# Patient Record
Sex: FEMALE | Race: WHITE | NOT HISPANIC OR LATINO | Employment: PART TIME | URBAN - METROPOLITAN AREA
[De-identification: names, ages, dates, MRNs, and addresses within clinical notes are randomized per-mention and may not be internally consistent; named-entity substitution may affect disease eponyms.]

---

## 2023-04-07 LAB
ALANINE AMINOTRANSFERASE (SGPT) (U/L) IN SER/PLAS: 27 U/L (ref 7–45)
ALBUMIN (G/DL) IN SER/PLAS: 3.6 G/DL (ref 3.4–5)
ALKALINE PHOSPHATASE (U/L) IN SER/PLAS: 36 U/L (ref 33–110)
ANION GAP IN SER/PLAS: 9 MMOL/L (ref 10–20)
ASPARTATE AMINOTRANSFERASE (SGOT) (U/L) IN SER/PLAS: 15 U/L (ref 9–39)
BASOPHILS (10*3/UL) IN BLOOD BY AUTOMATED COUNT: 0.06 X10E9/L (ref 0–0.1)
BASOPHILS/100 LEUKOCYTES IN BLOOD BY AUTOMATED COUNT: 0.5 % (ref 0–2)
BILIRUBIN TOTAL (MG/DL) IN SER/PLAS: 0.8 MG/DL (ref 0–1.2)
CALCIDIOL (25 OH VITAMIN D3) (NG/ML) IN SER/PLAS: 28 NG/ML
CALCIUM (MG/DL) IN SER/PLAS: 9.2 MG/DL (ref 8.6–10.3)
CARBON DIOXIDE, TOTAL (MMOL/L) IN SER/PLAS: 36 MMOL/L (ref 21–32)
CHLORIDE (MMOL/L) IN SER/PLAS: 101 MMOL/L (ref 98–107)
COBALAMIN (VITAMIN B12) (PG/ML) IN SER/PLAS: 370 PG/ML (ref 211–911)
CREATININE (MG/DL) IN SER/PLAS: 0.85 MG/DL (ref 0.5–1.05)
EOSINOPHILS (10*3/UL) IN BLOOD BY AUTOMATED COUNT: 0.31 X10E9/L (ref 0–0.7)
EOSINOPHILS/100 LEUKOCYTES IN BLOOD BY AUTOMATED COUNT: 2.8 % (ref 0–6)
ERYTHROCYTE DISTRIBUTION WIDTH (RATIO) BY AUTOMATED COUNT: 16 % (ref 11.5–14.5)
ERYTHROCYTE MEAN CORPUSCULAR HEMOGLOBIN CONCENTRATION (G/DL) BY AUTOMATED: 30 G/DL (ref 32–36)
ERYTHROCYTE MEAN CORPUSCULAR VOLUME (FL) BY AUTOMATED COUNT: 87 FL (ref 80–100)
ERYTHROCYTES (10*6/UL) IN BLOOD BY AUTOMATED COUNT: 4.74 X10E12/L (ref 4–5.2)
FERRITIN (UG/LL) IN SER/PLAS: 162 UG/L (ref 8–150)
GFR FEMALE: 79 ML/MIN/1.73M2
GLUCOSE (MG/DL) IN SER/PLAS: 103 MG/DL (ref 74–99)
HEMATOCRIT (%) IN BLOOD BY AUTOMATED COUNT: 41.3 % (ref 36–46)
HEMOGLOBIN (G/DL) IN BLOOD: 12.4 G/DL (ref 12–16)
IGE (IU/L) IN SERUM OR PLASMA: 138 IU/ML (ref 0–214)
IMMATURE GRANULOCYTES/100 LEUKOCYTES IN BLOOD BY AUTOMATED COUNT: 1.3 % (ref 0–0.9)
IRON (UG/DL) IN SER/PLAS: 84 UG/DL (ref 35–150)
IRON BINDING CAPACITY (UG/DL) IN SER/PLAS: 337 UG/DL (ref 240–445)
IRON SATURATION (%) IN SER/PLAS: 25 % (ref 25–45)
LEUKOCYTES (10*3/UL) IN BLOOD BY AUTOMATED COUNT: 11.2 X10E9/L (ref 4.4–11.3)
LYMPHOCYTES (10*3/UL) IN BLOOD BY AUTOMATED COUNT: 2.77 X10E9/L (ref 1.2–4.8)
LYMPHOCYTES/100 LEUKOCYTES IN BLOOD BY AUTOMATED COUNT: 24.7 % (ref 13–44)
MONOCYTES (10*3/UL) IN BLOOD BY AUTOMATED COUNT: 0.7 X10E9/L (ref 0.1–1)
MONOCYTES/100 LEUKOCYTES IN BLOOD BY AUTOMATED COUNT: 6.3 % (ref 2–10)
NEUTROPHILS (10*3/UL) IN BLOOD BY AUTOMATED COUNT: 7.21 X10E9/L (ref 1.2–7.7)
NEUTROPHILS/100 LEUKOCYTES IN BLOOD BY AUTOMATED COUNT: 64.4 % (ref 40–80)
PLATELETS (10*3/UL) IN BLOOD AUTOMATED COUNT: 242 X10E9/L (ref 150–450)
POTASSIUM (MMOL/L) IN SER/PLAS: 4.2 MMOL/L (ref 3.5–5.3)
PROTEIN TOTAL: 6.4 G/DL (ref 6.4–8.2)
SODIUM (MMOL/L) IN SER/PLAS: 142 MMOL/L (ref 136–145)
UREA NITROGEN (MG/DL) IN SER/PLAS: 16 MG/DL (ref 6–23)

## 2023-05-30 PROBLEM — I10 BENIGN HYPERTENSION: Status: ACTIVE | Noted: 2023-05-30

## 2023-05-30 PROBLEM — R06.02 SOB (SHORTNESS OF BREATH): Status: ACTIVE | Noted: 2023-05-30

## 2023-05-30 PROBLEM — J45.909 BRONCHIAL ASTHMA (HHS-HCC): Status: ACTIVE | Noted: 2023-05-30

## 2023-05-30 PROBLEM — G47.33 OSA (OBSTRUCTIVE SLEEP APNEA): Status: ACTIVE | Noted: 2023-05-30

## 2023-05-30 PROBLEM — E66.9 OBESITY: Status: ACTIVE | Noted: 2023-05-30

## 2023-05-30 PROBLEM — J45.901 SEVERE ASTHMA WITH EXACERBATION (HHS-HCC): Status: ACTIVE | Noted: 2023-05-30

## 2023-05-30 PROBLEM — J96.11 CHRONIC RESPIRATORY FAILURE WITH HYPOXIA (MULTI): Status: ACTIVE | Noted: 2023-05-30

## 2023-05-30 PROBLEM — K21.9 LARYNGOPHARYNGEAL REFLUX (LPR): Status: ACTIVE | Noted: 2023-05-30

## 2023-05-30 PROBLEM — R60.9 EDEMA: Status: ACTIVE | Noted: 2023-05-30

## 2023-05-30 RX ORDER — FAMOTIDINE 40 MG/1
40 TABLET, FILM COATED ORAL NIGHTLY
COMMUNITY
End: 2023-05-31 | Stop reason: ALTCHOICE

## 2023-05-30 RX ORDER — ASPIRIN 81 MG/1
1 TABLET ORAL DAILY
COMMUNITY

## 2023-05-30 RX ORDER — PREDNISONE 5 MG/1
5 TABLET ORAL DAILY
COMMUNITY
Start: 2023-03-22 | End: 2023-11-30 | Stop reason: HOSPADM

## 2023-05-30 RX ORDER — ALBUTEROL SULFATE 90 UG/1
2 AEROSOL, METERED RESPIRATORY (INHALATION) EVERY 4 HOURS PRN
COMMUNITY
Start: 2023-03-22 | End: 2023-10-23 | Stop reason: SDUPTHER

## 2023-05-30 RX ORDER — AZELASTINE 1 MG/ML
1 SPRAY, METERED NASAL 2 TIMES DAILY
COMMUNITY
End: 2023-11-30 | Stop reason: HOSPADM

## 2023-05-30 RX ORDER — LOSARTAN POTASSIUM 100 MG/1
100 TABLET ORAL DAILY
COMMUNITY
End: 2023-06-30 | Stop reason: SDUPTHER

## 2023-05-30 RX ORDER — CETIRIZINE HYDROCHLORIDE 10 MG/1
10 TABLET ORAL DAILY
COMMUNITY
End: 2023-12-19 | Stop reason: SDUPTHER

## 2023-05-30 RX ORDER — IPRATROPIUM BROMIDE AND ALBUTEROL SULFATE 2.5; .5 MG/3ML; MG/3ML
3 SOLUTION RESPIRATORY (INHALATION) EVERY 4 HOURS PRN
COMMUNITY
Start: 2023-03-27 | End: 2023-10-30 | Stop reason: SDUPTHER

## 2023-05-30 RX ORDER — THEOPHYLLINE 400 MG/1
400 TABLET, EXTENDED RELEASE ORAL DAILY
COMMUNITY
End: 2023-10-27 | Stop reason: SDUPTHER

## 2023-05-30 RX ORDER — MONTELUKAST SODIUM 10 MG/1
10 TABLET ORAL NIGHTLY
COMMUNITY
Start: 2023-03-27 | End: 2023-10-23 | Stop reason: SDUPTHER

## 2023-05-30 RX ORDER — CARVEDILOL 25 MG/1
25 TABLET ORAL
COMMUNITY
End: 2023-08-14 | Stop reason: SDUPTHER

## 2023-05-30 RX ORDER — FLUTICASONE FUROATE, UMECLIDINIUM BROMIDE AND VILANTEROL TRIFENATATE 200; 62.5; 25 UG/1; UG/1; UG/1
1 POWDER RESPIRATORY (INHALATION) DAILY
COMMUNITY
Start: 2023-03-09 | End: 2023-10-23 | Stop reason: SDUPTHER

## 2023-05-30 RX ORDER — ACETAMINOPHEN 500 MG
1 TABLET ORAL DAILY
COMMUNITY

## 2023-05-30 RX ORDER — LEVOTHYROXINE SODIUM 100 UG/1
100 TABLET ORAL
COMMUNITY
Start: 2023-04-09 | End: 2023-06-30 | Stop reason: SDUPTHER

## 2023-05-30 RX ORDER — FLUTICASONE PROPIONATE 50 MCG
1 SPRAY, SUSPENSION (ML) NASAL 2 TIMES DAILY
COMMUNITY
End: 2023-11-30 | Stop reason: HOSPADM

## 2023-05-30 RX ORDER — AMLODIPINE BESYLATE 10 MG/1
10 TABLET ORAL DAILY
COMMUNITY
End: 2023-06-30 | Stop reason: ALTCHOICE

## 2023-05-30 RX ORDER — GUAIFENESIN 1200 MG/1
1200 TABLET, EXTENDED RELEASE ORAL EVERY 12 HOURS PRN
COMMUNITY
Start: 2023-03-08

## 2023-05-31 ENCOUNTER — OFFICE VISIT (OUTPATIENT)
Dept: PRIMARY CARE | Facility: CLINIC | Age: 58
End: 2023-05-31
Payer: COMMERCIAL

## 2023-05-31 VITALS
DIASTOLIC BLOOD PRESSURE: 80 MMHG | BODY MASS INDEX: 46.32 KG/M2 | WEIGHT: 288.2 LBS | HEIGHT: 66 IN | RESPIRATION RATE: 18 BRPM | SYSTOLIC BLOOD PRESSURE: 140 MMHG | HEART RATE: 74 BPM | OXYGEN SATURATION: 94 %

## 2023-05-31 DIAGNOSIS — Z12.31 ENCOUNTER FOR SCREENING MAMMOGRAM FOR MALIGNANT NEOPLASM OF BREAST: ICD-10-CM

## 2023-05-31 DIAGNOSIS — Z13.29 SCREENING FOR HYPOTHYROIDISM: ICD-10-CM

## 2023-05-31 DIAGNOSIS — G47.33 OSA (OBSTRUCTIVE SLEEP APNEA): ICD-10-CM

## 2023-05-31 DIAGNOSIS — Z12.11 SCREENING FOR COLORECTAL CANCER: ICD-10-CM

## 2023-05-31 DIAGNOSIS — I10 BENIGN HYPERTENSION: ICD-10-CM

## 2023-05-31 DIAGNOSIS — K21.9 LARYNGOPHARYNGEAL REFLUX (LPR): ICD-10-CM

## 2023-05-31 DIAGNOSIS — R73.01 ELEVATED FASTING BLOOD SUGAR: ICD-10-CM

## 2023-05-31 DIAGNOSIS — J45.51 SEVERE PERSISTENT ASTHMA WITH EXACERBATION (MULTI): ICD-10-CM

## 2023-05-31 DIAGNOSIS — J96.11 CHRONIC RESPIRATORY FAILURE WITH HYPOXIA (MULTI): Primary | ICD-10-CM

## 2023-05-31 DIAGNOSIS — Z12.12 SCREENING FOR COLORECTAL CANCER: ICD-10-CM

## 2023-05-31 DIAGNOSIS — Z13.220 SCREENING FOR HYPERLIPIDEMIA: ICD-10-CM

## 2023-05-31 DIAGNOSIS — E66.01 CLASS 3 SEVERE OBESITY DUE TO EXCESS CALORIES WITH SERIOUS COMORBIDITY AND BODY MASS INDEX (BMI) OF 45.0 TO 49.9 IN ADULT (MULTI): ICD-10-CM

## 2023-05-31 PROBLEM — R06.02 SOB (SHORTNESS OF BREATH): Status: RESOLVED | Noted: 2023-05-30 | Resolved: 2023-05-31

## 2023-05-31 PROCEDURE — 3008F BODY MASS INDEX DOCD: CPT

## 2023-05-31 PROCEDURE — 3077F SYST BP >= 140 MM HG: CPT

## 2023-05-31 PROCEDURE — 1036F TOBACCO NON-USER: CPT

## 2023-05-31 PROCEDURE — 3079F DIAST BP 80-89 MM HG: CPT

## 2023-05-31 PROCEDURE — 99204 OFFICE O/P NEW MOD 45 MIN: CPT

## 2023-05-31 PROCEDURE — 99401 PREV MED CNSL INDIV APPRX 15: CPT

## 2023-05-31 RX ORDER — MEPOLIZUMAB 100 MG/ML
100 INJECTION, SOLUTION SUBCUTANEOUS
COMMUNITY
End: 2024-04-19

## 2023-05-31 ASSESSMENT — ENCOUNTER SYMPTOMS
CARDIOVASCULAR NEGATIVE: 1
ENDOCRINE NEGATIVE: 1
HEMATOLOGIC/LYMPHATIC NEGATIVE: 1
RESPIRATORY NEGATIVE: 1
CONSTITUTIONAL NEGATIVE: 1
EYES NEGATIVE: 1
ALLERGIC/IMMUNOLOGIC NEGATIVE: 1
GASTROINTESTINAL NEGATIVE: 1
NEUROLOGICAL NEGATIVE: 1
MUSCULOSKELETAL NEGATIVE: 1
PSYCHIATRIC NEGATIVE: 1

## 2023-05-31 ASSESSMENT — PATIENT HEALTH QUESTIONNAIRE - PHQ9
SUM OF ALL RESPONSES TO PHQ9 QUESTIONS 1 AND 2: 0
2. FEELING DOWN, DEPRESSED OR HOPELESS: NOT AT ALL
1. LITTLE INTEREST OR PLEASURE IN DOING THINGS: NOT AT ALL

## 2023-05-31 NOTE — PROGRESS NOTES
"Subjective   Patient ID: Zohra Toledo is a 58 y.o. female who presents for New Patient Visit.    HPI a 58-year-old female with past medical history of obstructive sleep apnea, severe asthma with exacerbation, chronic respiratory failure with hypoxia,  Hypertension, LPR, dependent edema, obesity arrives to the clinic with chief complaint of new patient establishment.  Patient is here to establish with a new primary care provider as her previous one is no longer in network and that she was referred here by her pulmonologist.  She continues to follow closely with our  pulmonology team for her severe asthma.  She is on 2 to 4 L via nasal cannula for respiratory hypoxia at all times.  She is also concerned about the dependent edema that has worsened over the last couple of months and is wondering if this is related to her breathing or if to her amlodipine.  Other than this, the patient denies any chest pain, shortness of breath, diarrhea, fevers, chills, head pain, COVID-like symptoms.    Review of Systems   Constitutional: Negative.    HENT: Negative.     Eyes: Negative.    Respiratory: Negative.     Cardiovascular: Negative.    Gastrointestinal: Negative.    Endocrine: Negative.    Genitourinary: Negative.    Musculoskeletal: Negative.    Skin: Negative.    Allergic/Immunologic: Negative.    Neurological: Negative.    Hematological: Negative.    Psychiatric/Behavioral: Negative.     All other systems reviewed and are negative.      Objective   /80 (BP Location: Right arm, BP Cuff Size: Large adult)   Pulse 74   Resp 18   Ht 1.676 m (5' 6\")   Wt 131 kg (288 lb 3.2 oz)   SpO2 94%   BMI 46.52 kg/m²     Physical Exam  Vitals and nursing note reviewed.   Constitutional:       Appearance: Normal appearance.   HENT:      Head: Normocephalic and atraumatic.      Right Ear: Tympanic membrane normal.      Left Ear: Tympanic membrane normal.      Nose: Nose normal.      Mouth/Throat:      Mouth: Mucous membranes " are moist.      Pharynx: Oropharynx is clear.   Eyes:      Extraocular Movements: Extraocular movements intact.      Conjunctiva/sclera: Conjunctivae normal.      Pupils: Pupils are equal, round, and reactive to light.   Cardiovascular:      Rate and Rhythm: Normal rate and regular rhythm.   Pulmonary:      Effort: Pulmonary effort is normal.      Breath sounds: Normal breath sounds.   Abdominal:      General: Bowel sounds are normal.      Palpations: Abdomen is soft.   Musculoskeletal:         General: Normal range of motion.      Cervical back: Normal range of motion and neck supple.   Skin:     General: Skin is warm.      Capillary Refill: Capillary refill takes less than 2 seconds.   Neurological:      General: No focal deficit present.      Mental Status: She is alert and oriented to person, place, and time. Mental status is at baseline.   Psychiatric:         Mood and Affect: Mood normal.         Behavior: Behavior normal.         Thought Content: Thought content normal.         Judgment: Judgment normal.         Assessment/Plan   Problem List Items Addressed This Visit          Nervous    BOBO (obstructive sleep apnea)       Respiratory    Severe asthma with exacerbation    Chronic respiratory failure with hypoxia (CMS/HCC) - Primary    Relevant Orders    CBC    Comprehensive Metabolic Panel    Follow Up In [facility] - PCP       Circulatory    Benign hypertension       Endocrine/Metabolic    Obesity       Other    Laryngopharyngeal reflux (LPR)     Other Visit Diagnoses       Elevated fasting blood sugar        Relevant Orders    Hemoglobin A1C    Follow Up In [facility] - PCP    Screening for hypothyroidism        Relevant Orders    TSH with reflex to Free T4 if abnormal    Follow Up In [facility] - PCP    Screening for hyperlipidemia        Relevant Orders    Lipid Panel    Follow Up In [facility] - PCP          It was a pleasure seeing in the office today.    I  have ordered blood work for you to complete 1 week prior to your next appointment.  These labs require to fast.  I will call you with any abnormal results.  Your next appointment will be in 1 month.    In regards to your dependent edema, you report that in the past, you are able to elevate your legs above your heart and noticed a decrease in swelling.  You report that this has not been the case over the last couple of months.  You are wondering if this might be due to the amlodipine 10 mg oral tablet daily.  We have agreed to discontinue your amlodipine 10 mg oral tablet daily and increase your losartan 100 mg daily to 100 mg oral tablet twice a day.  Do this for 1 month and we will reevaluate at your 1 month appointment.    Continue the treatment plan and oxygen therapy managed by your pulmonologist.  Continue using your CPAP machine for your BOBO prescribed by your pulmonologist as well.    Your blood pressure is controlled today.  Please begin the new blood pressure management program that we have discussed in the office today.    We have discussed screening exams in the office today.  We have discussed about a Cologuard screening kit, mammogram, bone density scan, CT cardiac scoring.  She will think about the bone density scan and CT cardiac scoring and you report that she will think about it. However you have agreed to the Cologuard screening kit and mammogram.  I have put both of these orders in.    I recommend a low carb diet and moderate to high intensity exercise for at least a total of 150 minutes per week for obesity with BMI of 46.52. Normal adult BMI that you should be targeting  is 20 to 25. Fifteen minutes was spent counselling.     Anticipatory guidance, age appropriate vaccines, screening exams, health promotion and prevention discussed.    This document was generated using the assistance of voice recognition software. If there are any errors of spelling, grammar, syntax, or meaning; please feel free  to contact me directly for clarification.

## 2023-06-19 ENCOUNTER — LAB (OUTPATIENT)
Dept: LAB | Facility: LAB | Age: 58
End: 2023-06-19
Payer: COMMERCIAL

## 2023-06-19 DIAGNOSIS — J96.11 CHRONIC RESPIRATORY FAILURE WITH HYPOXIA (MULTI): ICD-10-CM

## 2023-06-19 DIAGNOSIS — Z13.29 SCREENING FOR HYPOTHYROIDISM: ICD-10-CM

## 2023-06-19 DIAGNOSIS — R73.01 ELEVATED FASTING BLOOD SUGAR: ICD-10-CM

## 2023-06-19 DIAGNOSIS — Z13.220 SCREENING FOR HYPERLIPIDEMIA: ICD-10-CM

## 2023-06-19 LAB
ALANINE AMINOTRANSFERASE (SGPT) (U/L) IN SER/PLAS: 29 U/L (ref 7–45)
ALBUMIN (G/DL) IN SER/PLAS: 3.9 G/DL (ref 3.4–5)
ALKALINE PHOSPHATASE (U/L) IN SER/PLAS: 39 U/L (ref 33–110)
ANION GAP IN SER/PLAS: 14 MMOL/L (ref 10–20)
ASPARTATE AMINOTRANSFERASE (SGOT) (U/L) IN SER/PLAS: 17 U/L (ref 9–39)
BILIRUBIN TOTAL (MG/DL) IN SER/PLAS: 0.7 MG/DL (ref 0–1.2)
CALCIUM (MG/DL) IN SER/PLAS: 9.3 MG/DL (ref 8.6–10.3)
CARBON DIOXIDE, TOTAL (MMOL/L) IN SER/PLAS: 31 MMOL/L (ref 21–32)
CHLORIDE (MMOL/L) IN SER/PLAS: 104 MMOL/L (ref 98–107)
CHOLESTEROL (MG/DL) IN SER/PLAS: 161 MG/DL (ref 0–199)
CHOLESTEROL IN HDL (MG/DL) IN SER/PLAS: 49.2 MG/DL
CHOLESTEROL/HDL RATIO: 3.3
CREATININE (MG/DL) IN SER/PLAS: 1.01 MG/DL (ref 0.5–1.05)
ERYTHROCYTE DISTRIBUTION WIDTH (RATIO) BY AUTOMATED COUNT: 15.9 % (ref 11.5–14.5)
ERYTHROCYTE MEAN CORPUSCULAR HEMOGLOBIN CONCENTRATION (G/DL) BY AUTOMATED: 30.3 G/DL (ref 32–36)
ERYTHROCYTE MEAN CORPUSCULAR VOLUME (FL) BY AUTOMATED COUNT: 89 FL (ref 80–100)
ERYTHROCYTES (10*6/UL) IN BLOOD BY AUTOMATED COUNT: 4.5 X10E12/L (ref 4–5.2)
ESTIMATED AVERAGE GLUCOSE FOR HBA1C: 151 MG/DL
GFR FEMALE: 64 ML/MIN/1.73M2
GLUCOSE (MG/DL) IN SER/PLAS: 96 MG/DL (ref 74–99)
HEMATOCRIT (%) IN BLOOD BY AUTOMATED COUNT: 40.2 % (ref 36–46)
HEMOGLOBIN (G/DL) IN BLOOD: 12.2 G/DL (ref 12–16)
HEMOGLOBIN A1C/HEMOGLOBIN TOTAL IN BLOOD: 6.9 %
LDL: 65 MG/DL (ref 0–99)
LEUKOCYTES (10*3/UL) IN BLOOD BY AUTOMATED COUNT: 7.3 X10E9/L (ref 4.4–11.3)
NON HDL CHOLESTEROL: 112 MG/DL
PLATELETS (10*3/UL) IN BLOOD AUTOMATED COUNT: 269 X10E9/L (ref 150–450)
POTASSIUM (MMOL/L) IN SER/PLAS: 4.6 MMOL/L (ref 3.5–5.3)
PROTEIN TOTAL: 6.3 G/DL (ref 6.4–8.2)
SODIUM (MMOL/L) IN SER/PLAS: 144 MMOL/L (ref 136–145)
THYROTROPIN (MIU/L) IN SER/PLAS BY DETECTION LIMIT <= 0.05 MIU/L: 1.12 MIU/L (ref 0.44–3.98)
TRIGLYCERIDE (MG/DL) IN SER/PLAS: 236 MG/DL (ref 0–149)
UREA NITROGEN (MG/DL) IN SER/PLAS: 16 MG/DL (ref 6–23)
VLDL: 47 MG/DL (ref 0–40)

## 2023-06-19 PROCEDURE — 85027 COMPLETE CBC AUTOMATED: CPT

## 2023-06-19 PROCEDURE — 84443 ASSAY THYROID STIM HORMONE: CPT

## 2023-06-19 PROCEDURE — 83036 HEMOGLOBIN GLYCOSYLATED A1C: CPT

## 2023-06-19 PROCEDURE — 80053 COMPREHEN METABOLIC PANEL: CPT

## 2023-06-19 PROCEDURE — 80061 LIPID PANEL: CPT

## 2023-06-19 PROCEDURE — 36415 COLL VENOUS BLD VENIPUNCTURE: CPT

## 2023-06-24 LAB — NONINV COLON CA DNA+OCC BLD SCRN STL QL: NEGATIVE

## 2023-06-30 ENCOUNTER — OFFICE VISIT (OUTPATIENT)
Dept: PRIMARY CARE | Facility: CLINIC | Age: 58
End: 2023-06-30
Payer: COMMERCIAL

## 2023-06-30 VITALS
DIASTOLIC BLOOD PRESSURE: 82 MMHG | WEIGHT: 284 LBS | RESPIRATION RATE: 16 BRPM | SYSTOLIC BLOOD PRESSURE: 118 MMHG | BODY MASS INDEX: 45.64 KG/M2 | HEIGHT: 66 IN | HEART RATE: 84 BPM | OXYGEN SATURATION: 92 %

## 2023-06-30 DIAGNOSIS — R73.01 ELEVATED FASTING BLOOD SUGAR: ICD-10-CM

## 2023-06-30 DIAGNOSIS — E11.9 TYPE 2 DIABETES MELLITUS WITHOUT COMPLICATION, WITHOUT LONG-TERM CURRENT USE OF INSULIN (MULTI): Primary | ICD-10-CM

## 2023-06-30 DIAGNOSIS — R22.43 LOCALIZED SWELLING OF BOTH LOWER LEGS: ICD-10-CM

## 2023-06-30 DIAGNOSIS — E03.9 HYPOTHYROIDISM, UNSPECIFIED TYPE: ICD-10-CM

## 2023-06-30 DIAGNOSIS — J96.11 CHRONIC RESPIRATORY FAILURE WITH HYPOXIA (MULTI): ICD-10-CM

## 2023-06-30 DIAGNOSIS — Z13.220 SCREENING FOR HYPERLIPIDEMIA: ICD-10-CM

## 2023-06-30 DIAGNOSIS — I10 BENIGN HYPERTENSION: ICD-10-CM

## 2023-06-30 DIAGNOSIS — Z13.29 SCREENING FOR HYPOTHYROIDISM: ICD-10-CM

## 2023-06-30 PROCEDURE — 3079F DIAST BP 80-89 MM HG: CPT

## 2023-06-30 PROCEDURE — 3044F HG A1C LEVEL LT 7.0%: CPT

## 2023-06-30 PROCEDURE — 99213 OFFICE O/P EST LOW 20 MIN: CPT

## 2023-06-30 PROCEDURE — 3008F BODY MASS INDEX DOCD: CPT

## 2023-06-30 PROCEDURE — 3074F SYST BP LT 130 MM HG: CPT

## 2023-06-30 PROCEDURE — 4010F ACE/ARB THERAPY RXD/TAKEN: CPT

## 2023-06-30 PROCEDURE — 1036F TOBACCO NON-USER: CPT

## 2023-06-30 RX ORDER — LEVOTHYROXINE SODIUM 100 UG/1
100 TABLET ORAL
Qty: 90 TABLET | Refills: 0 | Status: SHIPPED | OUTPATIENT
Start: 2023-06-30 | End: 2023-11-30 | Stop reason: HOSPADM

## 2023-06-30 RX ORDER — LOSARTAN POTASSIUM 100 MG/1
100 TABLET ORAL DAILY
Qty: 90 TABLET | Refills: 3 | Status: SHIPPED | OUTPATIENT
Start: 2023-06-30 | End: 2023-08-18 | Stop reason: SDUPTHER

## 2023-06-30 RX ORDER — METFORMIN HYDROCHLORIDE 500 MG/1
500 TABLET ORAL
Qty: 180 TABLET | Refills: 1 | Status: SHIPPED | OUTPATIENT
Start: 2023-06-30 | End: 2023-12-11 | Stop reason: SDUPTHER

## 2023-06-30 RX ORDER — TORSEMIDE 5 MG/1
5 TABLET ORAL DAILY
Qty: 90 TABLET | Refills: 3 | Status: SHIPPED | OUTPATIENT
Start: 2023-06-30 | End: 2023-11-30 | Stop reason: HOSPADM

## 2023-06-30 ASSESSMENT — ENCOUNTER SYMPTOMS
ALLERGIC/IMMUNOLOGIC NEGATIVE: 1
NEUROLOGICAL NEGATIVE: 1
ENDOCRINE NEGATIVE: 1
CARDIOVASCULAR NEGATIVE: 1
CONSTITUTIONAL NEGATIVE: 1
RESPIRATORY NEGATIVE: 1
DEPRESSION: 0
GASTROINTESTINAL NEGATIVE: 1
EYES NEGATIVE: 1
OCCASIONAL FEELINGS OF UNSTEADINESS: 0
LOSS OF SENSATION IN FEET: 0
HEMATOLOGIC/LYMPHATIC NEGATIVE: 1
MUSCULOSKELETAL NEGATIVE: 1
PSYCHIATRIC NEGATIVE: 1

## 2023-06-30 ASSESSMENT — ANXIETY QUESTIONNAIRES
4. TROUBLE RELAXING: NOT AT ALL
2. NOT BEING ABLE TO STOP OR CONTROL WORRYING: NOT AT ALL
3. WORRYING TOO MUCH ABOUT DIFFERENT THINGS: NOT AT ALL
1. FEELING NERVOUS, ANXIOUS, OR ON EDGE: NOT AT ALL
6. BECOMING EASILY ANNOYED OR IRRITABLE: NOT AT ALL
5. BEING SO RESTLESS THAT IT IS HARD TO SIT STILL: NOT AT ALL
IF YOU CHECKED OFF ANY PROBLEMS ON THIS QUESTIONNAIRE, HOW DIFFICULT HAVE THESE PROBLEMS MADE IT FOR YOU TO DO YOUR WORK, TAKE CARE OF THINGS AT HOME, OR GET ALONG WITH OTHER PEOPLE: NOT DIFFICULT AT ALL
GAD7 TOTAL SCORE: 0
7. FEELING AFRAID AS IF SOMETHING AWFUL MIGHT HAPPEN: NOT AT ALL

## 2023-06-30 ASSESSMENT — PATIENT HEALTH QUESTIONNAIRE - PHQ9
2. FEELING DOWN, DEPRESSED OR HOPELESS: NOT AT ALL
1. LITTLE INTEREST OR PLEASURE IN DOING THINGS: NOT AT ALL
SUM OF ALL RESPONSES TO PHQ9 QUESTIONS 1 AND 2: 0

## 2023-06-30 NOTE — PROGRESS NOTES
"Subjective   Patient ID: Zohra Toledo is a 58 y.o. female who presents for Follow-up.    HPI A 58-year-old female with past medical history of obstructive sleep apnea, severe asthma with exacerbation, chronic respiratory failure with hypoxia,  Hypertension, LPR, dependent edema, obesity arrives to the clinic with chief complaint of 1 month follow up. At her last appointment, she was given directions to complete blood work. She did complete that today and would like to review them. She also needs medications refills. She has no other complaints. She completed her US duplex for her baker's cyst which is now normal and a mammogram with benign results.     Review of Systems   Constitutional: Negative.    HENT: Negative.     Eyes: Negative.    Respiratory: Negative.     Cardiovascular: Negative.    Gastrointestinal: Negative.    Endocrine: Negative.    Genitourinary: Negative.    Musculoskeletal: Negative.    Skin: Negative.    Allergic/Immunologic: Negative.    Neurological: Negative.    Hematological: Negative.    Psychiatric/Behavioral: Negative.     All other systems reviewed and are negative.      Objective   /82   Pulse 84   Resp 16   Ht 1.676 m (5' 6\")   Wt 129 kg (284 lb)   SpO2 92%   BMI 45.84 kg/m²     Physical Exam  Vitals and nursing note reviewed.   Constitutional:       Appearance: Normal appearance.   HENT:      Head: Normocephalic and atraumatic.      Right Ear: Tympanic membrane normal.      Left Ear: Tympanic membrane normal.      Nose: Nose normal.      Mouth/Throat:      Mouth: Mucous membranes are moist.      Pharynx: Oropharynx is clear.   Eyes:      Extraocular Movements: Extraocular movements intact.      Conjunctiva/sclera: Conjunctivae normal.      Pupils: Pupils are equal, round, and reactive to light.   Cardiovascular:      Rate and Rhythm: Normal rate and regular rhythm.   Pulmonary:      Effort: Pulmonary effort is normal.      Breath sounds: Normal breath sounds.   Abdominal: "      General: Bowel sounds are normal.      Palpations: Abdomen is soft.   Musculoskeletal:         General: Normal range of motion.      Cervical back: Normal range of motion and neck supple.   Skin:     General: Skin is warm.      Capillary Refill: Capillary refill takes less than 2 seconds.   Neurological:      General: No focal deficit present.      Mental Status: She is alert and oriented to person, place, and time. Mental status is at baseline.   Psychiatric:         Mood and Affect: Mood normal.         Behavior: Behavior normal.         Thought Content: Thought content normal.         Judgment: Judgment normal.       Assessment/Plan   Problem List Items Addressed This Visit       Benign hypertension    Relevant Medications    losartan (Cozaar) 100 mg tablet    Chronic respiratory failure with hypoxia (CMS/HCC)     Other Visit Diagnoses       Type 2 diabetes mellitus without complication, without long-term current use of insulin (CMS/HCC)    -  Primary    Relevant Medications    metFORMIN (Glucophage) 500 mg tablet    Other Relevant Orders    Hemoglobin A1C    Elevated fasting blood sugar        Screening for hypothyroidism        Screening for hyperlipidemia        Relevant Orders    Lipid Panel    Hypothyroidism, unspecified type        Relevant Medications    levothyroxine (Synthroid, Levoxyl) 100 mcg tablet    Other Relevant Orders    TSH with reflex to Free T4 if abnormal    Localized swelling of both lower legs        Relevant Medications    torsemide (Demadex) 5 mg tablet          It was a pleasure seeing you in the office today.     Continue the treatment plan and regimen set forth by your pulmonologist.    Hemoglobin A1C is 6.9%. begin metformin 500mg BID. Continue to monitor sugars closely, follow diabetic diet and encouraged daily activity or exercise as able. Discussed side effects of metformin. Pt agreed to take medications as rx.     Lipid panel shows triglycerides above 250. Please begin taking  OTC Omega 3 fish oil.    Continue all medications as rx.    Swelling of the legs: Begin torsemide 10mg. Pt understands the side effects of this medication.     Follow up in 6 months with fasting labs with Dr. Lyndsay Hubbard.    Mammogram up to date. Repeat 2024.     I also advised you to follow low fat diet and exercise for at least 30 minutes daily.    Anticipatory guidance, age appropriate vaccines, screening exams, health promotion and prevention discussed.    This document was generated using the assistance of voice recognition software. If there are any errors of spelling, grammar, syntax, or meaning; please feel free to contact me directly for clarification.

## 2023-07-06 ENCOUNTER — TELEPHONE (OUTPATIENT)
Dept: PRIMARY CARE | Facility: CLINIC | Age: 58
End: 2023-07-06
Payer: COMMERCIAL

## 2023-07-06 NOTE — TELEPHONE ENCOUNTER
Pt left voicemail stating she started taking the metformin bid as prescribed and it upset her stomach so she is now taking it once a day. She is asking if its ok or if she needs to go back to taking once a day?

## 2023-07-19 ENCOUNTER — OFFICE VISIT (OUTPATIENT)
Dept: PRIMARY CARE | Facility: CLINIC | Age: 58
End: 2023-07-19
Payer: COMMERCIAL

## 2023-07-19 VITALS
HEART RATE: 74 BPM | DIASTOLIC BLOOD PRESSURE: 60 MMHG | RESPIRATION RATE: 16 BRPM | WEIGHT: 281 LBS | OXYGEN SATURATION: 96 % | BODY MASS INDEX: 45.16 KG/M2 | SYSTOLIC BLOOD PRESSURE: 102 MMHG | HEIGHT: 66 IN

## 2023-07-19 DIAGNOSIS — L03.90 CELLULITIS, UNSPECIFIED CELLULITIS SITE: Primary | ICD-10-CM

## 2023-07-19 PROCEDURE — 99213 OFFICE O/P EST LOW 20 MIN: CPT

## 2023-07-19 PROCEDURE — 3074F SYST BP LT 130 MM HG: CPT

## 2023-07-19 PROCEDURE — 3078F DIAST BP <80 MM HG: CPT

## 2023-07-19 PROCEDURE — 3008F BODY MASS INDEX DOCD: CPT

## 2023-07-19 PROCEDURE — 1036F TOBACCO NON-USER: CPT

## 2023-07-19 RX ORDER — CEPHALEXIN 500 MG/1
500 CAPSULE ORAL 2 TIMES DAILY
Qty: 14 CAPSULE | Refills: 0 | Status: SHIPPED | OUTPATIENT
Start: 2023-07-19 | End: 2023-07-26

## 2023-07-19 RX ORDER — TRIAMCINOLONE ACETONIDE 1 MG/G
CREAM TOPICAL 2 TIMES DAILY
Qty: 45 G | Refills: 0 | Status: SHIPPED | OUTPATIENT
Start: 2023-07-19 | End: 2023-11-30 | Stop reason: HOSPADM

## 2023-07-19 RX ORDER — SULFAMETHOXAZOLE AND TRIMETHOPRIM 800; 160 MG/1; MG/1
1 TABLET ORAL 2 TIMES DAILY
Qty: 14 TABLET | Refills: 0 | Status: SHIPPED | OUTPATIENT
Start: 2023-07-19 | End: 2023-07-26

## 2023-07-19 ASSESSMENT — ANXIETY QUESTIONNAIRES
IF YOU CHECKED OFF ANY PROBLEMS ON THIS QUESTIONNAIRE, HOW DIFFICULT HAVE THESE PROBLEMS MADE IT FOR YOU TO DO YOUR WORK, TAKE CARE OF THINGS AT HOME, OR GET ALONG WITH OTHER PEOPLE: NOT DIFFICULT AT ALL
1. FEELING NERVOUS, ANXIOUS, OR ON EDGE: NOT AT ALL
7. FEELING AFRAID AS IF SOMETHING AWFUL MIGHT HAPPEN: NOT AT ALL
3. WORRYING TOO MUCH ABOUT DIFFERENT THINGS: NOT AT ALL
GAD7 TOTAL SCORE: 0
2. NOT BEING ABLE TO STOP OR CONTROL WORRYING: NOT AT ALL
6. BECOMING EASILY ANNOYED OR IRRITABLE: NOT AT ALL
4. TROUBLE RELAXING: NOT AT ALL
5. BEING SO RESTLESS THAT IT IS HARD TO SIT STILL: NOT AT ALL

## 2023-07-19 ASSESSMENT — PATIENT HEALTH QUESTIONNAIRE - PHQ9
SUM OF ALL RESPONSES TO PHQ9 QUESTIONS 1 AND 2: 0
1. LITTLE INTEREST OR PLEASURE IN DOING THINGS: NOT AT ALL
2. FEELING DOWN, DEPRESSED OR HOPELESS: NOT AT ALL

## 2023-07-19 ASSESSMENT — ENCOUNTER SYMPTOMS
EYES NEGATIVE: 1
CONSTITUTIONAL NEGATIVE: 1
HEMATOLOGIC/LYMPHATIC NEGATIVE: 1
GASTROINTESTINAL NEGATIVE: 1
COLOR CHANGE: 1
PSYCHIATRIC NEGATIVE: 1
ENDOCRINE NEGATIVE: 1
RESPIRATORY NEGATIVE: 1
OCCASIONAL FEELINGS OF UNSTEADINESS: 0
MUSCULOSKELETAL NEGATIVE: 1
NEUROLOGICAL NEGATIVE: 1
ALLERGIC/IMMUNOLOGIC NEGATIVE: 1
DEPRESSION: 0
LOSS OF SENSATION IN FEET: 0
CARDIOVASCULAR NEGATIVE: 1

## 2023-07-19 NOTE — PROGRESS NOTES
"Subjective   Patient ID: Zohra Toledo is a 58 y.o. female who presents for red leg.    HPI a 58-year-old female arrives to clinic with chief complaint of bilateral lower extremity cellulitis.  Patient was seen in our office about 2 weeks ago and was given a water pill to help with the decrease swelling.  She reports that the swelling has gone down however still has some redness.  She states that her dog's tail hit her across the leg and was very painful.  She denies any fevers or chills however noticed that her legs are a lot more red.  She is wonder if she has cellulitis.  She denies any other symptoms.  Patient is able to ambulate and complete activities of daily living with no assistive devices or abnormalities.    Review of Systems   Constitutional: Negative.    HENT: Negative.     Eyes: Negative.    Respiratory: Negative.     Cardiovascular: Negative.    Gastrointestinal: Negative.    Endocrine: Negative.    Genitourinary: Negative.    Musculoskeletal: Negative.    Skin:  Positive for color change.   Allergic/Immunologic: Negative.    Neurological: Negative.    Hematological: Negative.    Psychiatric/Behavioral: Negative.     All other systems reviewed and are negative.      Objective   /60   Pulse 74   Resp 16   Ht 1.676 m (5' 6\")   Wt 127 kg (281 lb)   SpO2 96%   BMI 45.35 kg/m²     Physical Exam  Vitals and nursing note reviewed.   Constitutional:       Appearance: Normal appearance.   HENT:      Head: Normocephalic and atraumatic.      Right Ear: Tympanic membrane normal.      Left Ear: Tympanic membrane normal.      Nose: Nose normal.      Mouth/Throat:      Mouth: Mucous membranes are moist.      Pharynx: Oropharynx is clear.   Eyes:      Extraocular Movements: Extraocular movements intact.      Conjunctiva/sclera: Conjunctivae normal.      Pupils: Pupils are equal, round, and reactive to light.   Cardiovascular:      Rate and Rhythm: Normal rate and regular rhythm.   Pulmonary:      Effort: " "Pulmonary effort is normal.      Breath sounds: Normal breath sounds.   Abdominal:      General: Bowel sounds are normal.      Palpations: Abdomen is soft.   Musculoskeletal:         General: Normal range of motion.      Cervical back: Normal range of motion and neck supple.        Legs:       Comments: Redness and slight swelling noted. No open wounds, lacerations, or trauma noted   Skin:     General: Skin is warm.      Capillary Refill: Capillary refill takes less than 2 seconds.   Neurological:      General: No focal deficit present.      Mental Status: She is alert and oriented to person, place, and time. Mental status is at baseline.   Psychiatric:         Mood and Affect: Mood normal.         Behavior: Behavior normal.         Thought Content: Thought content normal.         Judgment: Judgment normal.         Assessment/Plan   Problem List Items Addressed This Visit       Cellulitis - Primary    Relevant Medications    cephalexin (Keflex) 500 mg capsule    sulfamethoxazole-trimethoprim (Bactrim DS) 800-160 mg tablet    triamcinolone (Kenalog) 0.1 % cream     It was a pleasure seeing you in the office today.    You do have cellulitis.  You may take Tylenol and ibuprofen for body aches and possible fever.  Please complete Keflex and Bactrim antibiotic regimen and you can use triamcinolone for the \"itchy spots\" on your legs as well.  We have discussed nonpharmacological ways to decrease the chances of future cellulitis.  We have discussed the plan of care in the office today.  Please call the office back in 7 to 10 days for medication effectiveness and efficiency.  We have also discussed the importance of heading to the emergency department for worsening signs or symptoms of systemic cellulitis, fevers, chills, nausea, vomiting, and diarrhea. You agree to the plan of care.    This document was generated using the assistance of voice recognition software. If there are any errors of spelling, grammar, syntax, or " meaning; please feel free to contact me directly for clarification.

## 2023-08-14 DIAGNOSIS — I10 BENIGN HYPERTENSION: ICD-10-CM

## 2023-08-15 RX ORDER — CARVEDILOL 25 MG/1
25 TABLET ORAL
Qty: 180 TABLET | Refills: 0 | Status: SHIPPED | OUTPATIENT
Start: 2023-08-15 | End: 2023-11-09 | Stop reason: SDUPTHER

## 2023-08-18 ENCOUNTER — TELEPHONE (OUTPATIENT)
Dept: PRIMARY CARE | Facility: CLINIC | Age: 58
End: 2023-08-18
Payer: COMMERCIAL

## 2023-08-18 DIAGNOSIS — I10 BENIGN HYPERTENSION: ICD-10-CM

## 2023-08-18 RX ORDER — LOSARTAN POTASSIUM 100 MG/1
100 TABLET ORAL 2 TIMES DAILY
Qty: 90 TABLET | Refills: 0 | Status: SHIPPED | OUTPATIENT
Start: 2023-08-18 | End: 2023-11-09 | Stop reason: SDUPTHER

## 2023-08-18 NOTE — TELEPHONE ENCOUNTER
Rx Refill Request Telephone Encounter    Name:  Zohra MICHELLE Tre  :  [id number]  Medication Name:  losartan  100mg          Specific Pharmacy location:  [facility]  **Precription was changed from once a day to twice a day and patient is out as of today.

## 2023-10-10 ENCOUNTER — APPOINTMENT (OUTPATIENT)
Dept: PRIMARY CARE | Facility: CLINIC | Age: 58
End: 2023-10-10
Payer: COMMERCIAL

## 2023-10-10 ENCOUNTER — TELEPHONE (OUTPATIENT)
Dept: PRIMARY CARE | Facility: CLINIC | Age: 58
End: 2023-10-10
Payer: COMMERCIAL

## 2023-10-10 NOTE — TELEPHONE ENCOUNTER
Patient is scheduled to see Jl tomorrow at 12:00, left her a detailed message with appt info. I advised patient to call the office and confirm if she can or can't keep the appt, she was also advised to Covid test before coming-if negative she is to come in and wear a mask while in the office.

## 2023-10-10 NOTE — TELEPHONE ENCOUNTER
**Former [facility] patient scheduled to establish with Dr. Hubbard but can you address since she's out of the office     Chief Complaint:    Symptoms: coughing up green phlegm, sinus/chest congestion     Duration: Week ago     Treatments: breathing treatments-nebulizer and inhalers-she has Asthma     Patient Requesting: Recommendation     Did the patient have their Covid Vaccine? No     Pharmacy: [facility]     **Hasn't covid tested

## 2023-10-11 ENCOUNTER — APPOINTMENT (OUTPATIENT)
Dept: PRIMARY CARE | Facility: CLINIC | Age: 58
End: 2023-10-11
Payer: COMMERCIAL

## 2023-10-23 DIAGNOSIS — J45.51 SEVERE PERSISTENT ASTHMA WITH EXACERBATION (MULTI): Primary | ICD-10-CM

## 2023-10-23 RX ORDER — MONTELUKAST SODIUM 10 MG/1
10 TABLET ORAL NIGHTLY
Qty: 90 TABLET | Refills: 3 | Status: SHIPPED | OUTPATIENT
Start: 2023-10-23 | End: 2023-12-19 | Stop reason: SDUPTHER

## 2023-10-23 RX ORDER — ALBUTEROL SULFATE 90 UG/1
2 AEROSOL, METERED RESPIRATORY (INHALATION) EVERY 4 HOURS PRN
Qty: 18 G | Refills: 11 | Status: SHIPPED | OUTPATIENT
Start: 2023-10-23 | End: 2024-04-29 | Stop reason: SDUPTHER

## 2023-10-23 RX ORDER — FLUTICASONE FUROATE, UMECLIDINIUM BROMIDE AND VILANTEROL TRIFENATATE 200; 62.5; 25 UG/1; UG/1; UG/1
1 POWDER RESPIRATORY (INHALATION) DAILY
Qty: 1 EACH | Refills: 11 | Status: SHIPPED | OUTPATIENT
Start: 2023-10-23 | End: 2023-10-24 | Stop reason: SDUPTHER

## 2023-10-24 DIAGNOSIS — J45.51 SEVERE PERSISTENT ASTHMA WITH EXACERBATION (MULTI): ICD-10-CM

## 2023-10-24 RX ORDER — FLUTICASONE FUROATE, UMECLIDINIUM BROMIDE AND VILANTEROL TRIFENATATE 200; 62.5; 25 UG/1; UG/1; UG/1
1 POWDER RESPIRATORY (INHALATION) DAILY
Qty: 90 EACH | Refills: 3 | Status: SHIPPED | OUTPATIENT
Start: 2023-10-24 | End: 2024-04-29 | Stop reason: SDUPTHER

## 2023-10-27 DIAGNOSIS — J45.51 SEVERE PERSISTENT ASTHMA WITH EXACERBATION (MULTI): Primary | ICD-10-CM

## 2023-10-27 DIAGNOSIS — J44.9 CHRONIC OBSTRUCTIVE PULMONARY DISEASE, UNSPECIFIED COPD TYPE (MULTI): Primary | ICD-10-CM

## 2023-10-27 RX ORDER — THEOPHYLLINE 400 MG/1
400 TABLET, EXTENDED RELEASE ORAL DAILY
Qty: 90 TABLET | Refills: 3 | Status: SHIPPED | OUTPATIENT
Start: 2023-10-27 | End: 2023-11-30 | Stop reason: HOSPADM

## 2023-10-27 RX ORDER — PREDNISONE 10 MG/1
TABLET ORAL
Qty: 40 TABLET | Refills: 0 | Status: SHIPPED | OUTPATIENT
Start: 2023-10-27 | End: 2023-11-12

## 2023-10-30 ENCOUNTER — OFFICE VISIT (OUTPATIENT)
Dept: PULMONOLOGY | Facility: HOSPITAL | Age: 58
End: 2023-10-30
Payer: COMMERCIAL

## 2023-10-30 VITALS
OXYGEN SATURATION: 95 % | WEIGHT: 282.6 LBS | BODY MASS INDEX: 45.42 KG/M2 | HEART RATE: 67 BPM | DIASTOLIC BLOOD PRESSURE: 94 MMHG | SYSTOLIC BLOOD PRESSURE: 169 MMHG | RESPIRATION RATE: 18 BRPM | HEIGHT: 66 IN | TEMPERATURE: 98 F

## 2023-10-30 DIAGNOSIS — G47.33 OBSTRUCTIVE SLEEP APNEA: ICD-10-CM

## 2023-10-30 DIAGNOSIS — J45.51 SEVERE PERSISTENT ASTHMA WITH EXACERBATION (MULTI): Primary | ICD-10-CM

## 2023-10-30 DIAGNOSIS — E66.01 CLASS 3 SEVERE OBESITY DUE TO EXCESS CALORIES WITH SERIOUS COMORBIDITY AND BODY MASS INDEX (BMI) OF 45.0 TO 49.9 IN ADULT (MULTI): ICD-10-CM

## 2023-10-30 PROCEDURE — 3077F SYST BP >= 140 MM HG: CPT | Performed by: NURSE PRACTITIONER

## 2023-10-30 PROCEDURE — 99213 OFFICE O/P EST LOW 20 MIN: CPT | Mod: ZK | Performed by: NURSE PRACTITIONER

## 2023-10-30 PROCEDURE — 99213 OFFICE O/P EST LOW 20 MIN: CPT | Performed by: NURSE PRACTITIONER

## 2023-10-30 PROCEDURE — 3008F BODY MASS INDEX DOCD: CPT | Performed by: NURSE PRACTITIONER

## 2023-10-30 PROCEDURE — 1036F TOBACCO NON-USER: CPT | Performed by: NURSE PRACTITIONER

## 2023-10-30 PROCEDURE — 3080F DIAST BP >= 90 MM HG: CPT | Performed by: NURSE PRACTITIONER

## 2023-10-30 RX ORDER — IPRATROPIUM BROMIDE AND ALBUTEROL SULFATE 2.5; .5 MG/3ML; MG/3ML
3 SOLUTION RESPIRATORY (INHALATION) EVERY 4 HOURS PRN
Qty: 180 ML | Refills: 11 | Status: SHIPPED | OUTPATIENT
Start: 2023-10-30

## 2023-10-30 RX ORDER — BENZONATATE 200 MG/1
200 CAPSULE ORAL 3 TIMES DAILY PRN
Qty: 30 CAPSULE | Refills: 0 | Status: SHIPPED | OUTPATIENT
Start: 2023-10-30 | End: 2023-11-30 | Stop reason: HOSPADM

## 2023-10-30 RX ORDER — DOXYCYCLINE 100 MG/1
100 CAPSULE ORAL 2 TIMES DAILY
Qty: 14 CAPSULE | Refills: 0 | Status: SHIPPED | OUTPATIENT
Start: 2023-10-30 | End: 2023-11-06

## 2023-10-30 ASSESSMENT — ENCOUNTER SYMPTOMS
CHILLS: 0
LOSS OF SENSATION IN FEET: 0
UNEXPECTED WEIGHT CHANGE: 0
DEPRESSION: 0
COUGH: 1
FATIGUE: 0
SHORTNESS OF BREATH: 1
WHEEZING: 1
OCCASIONAL FEELINGS OF UNSTEADINESS: 0
RHINORRHEA: 0
FEVER: 0

## 2023-10-30 ASSESSMENT — COLUMBIA-SUICIDE SEVERITY RATING SCALE - C-SSRS
2. HAVE YOU ACTUALLY HAD ANY THOUGHTS OF KILLING YOURSELF?: NO
6. HAVE YOU EVER DONE ANYTHING, STARTED TO DO ANYTHING, OR PREPARED TO DO ANYTHING TO END YOUR LIFE?: NO
1. IN THE PAST MONTH, HAVE YOU WISHED YOU WERE DEAD OR WISHED YOU COULD GO TO SLEEP AND NOT WAKE UP?: NO

## 2023-10-30 NOTE — PROGRESS NOTES
Subjective   Patient ID: Zohra Toledo is a 58 y.o. female who presents for asthma follow up.     HPI: Patient has PMH of HTN, factor V Leiden, provoked PE after ankle surgery, hypothyroidism, and obesity. She does have a parrot but states she has had this for four years and symptoms begun before this. She is a former smoker for 2 years only and quit 23 years ago. She has BOBO and is compliant with CPAP. She was started on a prednisone taper last week and starting to feel improvement. 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 She is with an increased productive cough. She has no other concerns.       Review of Systems   Constitutional:  Negative for chills, fatigue, fever and unexpected weight change.   HENT:  Negative for congestion, postnasal drip and rhinorrhea.    Respiratory:  Positive for cough (denies hemoptysis.), shortness of breath and wheezing.    Cardiovascular:  Negative for chest pain and leg swelling.   All other systems reviewed and are negative.      Objective   Physical Exam  Vitals reviewed.   Constitutional:       Appearance: Normal appearance.   HENT:      Head: Normocephalic.   Cardiovascular:      Rate and Rhythm: Normal rate and regular rhythm.   Pulmonary:      Effort: Pulmonary effort is normal.      Breath sounds: Decreased breath sounds present.   Skin:     General: Skin is warm and dry.   Neurological:      Mental Status: She is alert.         Assessment/Plan   1. Severe asthma, with acute exacerbation  2. Severe BOBO  3. Chronic respiratory failure with hypoxia, resolved  4. Obesity        Plan:     -PFTs showing BDR, FEV1 49-52%, % and % and DLCO normal.   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  -Continue Trelegy 200.   -Continue albuterol and Duonebs prn.   -Continue  Montelukast at HS.   -Continue prednisone taper until gone. Doxycycline BID x 1 week. Tessalon perles prn.   -, Eos were 360. Continue on Nucala as she has noted significant benefit in overall quality of life with this.   -Continue Mucinex 600-1200 mg BID prn.  -She reports morning headaches and snoring, HST April 11, 23 with severe BOBO and has received her APAP recently which she is trying to wear but waiting on a Nasal Mask. She is also now using CPAP nightly and tolerating well and has significant improvement hypersomnia.   -Weight loss strategies. Also suspect she could have OHS.   -She is a former smoker but only for 2 years and quit 23 years ago.   -Pt has leg edema but normal cardiac work up and no hx of alcohol use or liver disease. It improves when she lays down at night and suspect it is venous insufficiency related.   -She has gained 50 lbs since her  passed away in Spring 2022 during bereavement and is motivated to lose weight.     Overall we will continue treatment for acute exacerbation which was likely triggered by a viral infection. Otherwise breathing has been stable on [place]. I instructed her to call if not feeling improved and we will get a CXR. I will bring her back with me in 6 months otherwise. I instructed patient to call sooner if needed.

## 2023-10-30 NOTE — PATIENT INSTRUCTIONS
Continue prednisone taper until gone.   Please take Doxycycline twice a day until gone.   Continue on Trelegy daily.  Continue albuterol and nebulizer as needed.   Continue on Nucala.   Call me with any questions or concerns.   Follow up with me in 6 months.

## 2023-11-08 ENCOUNTER — HOSPITAL ENCOUNTER (EMERGENCY)
Facility: HOSPITAL | Age: 58
Discharge: HOME | End: 2023-11-08
Attending: EMERGENCY MEDICINE
Payer: COMMERCIAL

## 2023-11-08 ENCOUNTER — APPOINTMENT (OUTPATIENT)
Dept: RADIOLOGY | Facility: HOSPITAL | Age: 58
End: 2023-11-08
Payer: COMMERCIAL

## 2023-11-08 VITALS
TEMPERATURE: 97.3 F | HEIGHT: 66 IN | HEART RATE: 81 BPM | BODY MASS INDEX: 45 KG/M2 | RESPIRATION RATE: 16 BRPM | OXYGEN SATURATION: 98 % | DIASTOLIC BLOOD PRESSURE: 81 MMHG | SYSTOLIC BLOOD PRESSURE: 134 MMHG | WEIGHT: 280 LBS

## 2023-11-08 DIAGNOSIS — R07.9 CHEST PAIN, UNSPECIFIED TYPE: Primary | ICD-10-CM

## 2023-11-08 LAB
ALBUMIN SERPL BCP-MCNC: 3.9 G/DL (ref 3.4–5)
ALP SERPL-CCNC: 40 U/L (ref 33–110)
ALT SERPL W P-5'-P-CCNC: 21 U/L (ref 7–45)
ANION GAP BLDV CALCULATED.4IONS-SCNC: 6 MMOL/L (ref 10–25)
ANION GAP SERPL CALC-SCNC: 14 MMOL/L (ref 10–20)
AST SERPL W P-5'-P-CCNC: 17 U/L (ref 9–39)
BASE EXCESS BLDV CALC-SCNC: 7.4 MMOL/L (ref -2–3)
BASOPHILS # BLD AUTO: 0.04 X10*3/UL (ref 0–0.1)
BASOPHILS NFR BLD AUTO: 0.3 %
BILIRUB DIRECT SERPL-MCNC: 0.1 MG/DL (ref 0–0.3)
BILIRUB SERPL-MCNC: 0.8 MG/DL (ref 0–1.2)
BNP SERPL-MCNC: 14 PG/ML (ref 0–99)
BODY TEMPERATURE: 37 DEGREES CELSIUS
BUN SERPL-MCNC: 21 MG/DL (ref 6–23)
CA-I BLDV-SCNC: 1.12 MMOL/L (ref 1.1–1.33)
CALCIUM SERPL-MCNC: 9.2 MG/DL (ref 8.6–10.3)
CARDIAC TROPONIN I PNL SERPL HS: 3 NG/L (ref 0–13)
CARDIAC TROPONIN I PNL SERPL HS: 4 NG/L (ref 0–13)
CHLORIDE BLDV-SCNC: 102 MMOL/L (ref 98–107)
CHLORIDE SERPL-SCNC: 102 MMOL/L (ref 98–107)
CO2 SERPL-SCNC: 28 MMOL/L (ref 21–32)
CREAT SERPL-MCNC: 0.95 MG/DL (ref 0.5–1.05)
EOSINOPHIL # BLD AUTO: 0.06 X10*3/UL (ref 0–0.7)
EOSINOPHIL NFR BLD AUTO: 0.5 %
ERYTHROCYTE [DISTWIDTH] IN BLOOD BY AUTOMATED COUNT: 16 % (ref 11.5–14.5)
FLUAV RNA RESP QL NAA+PROBE: NOT DETECTED
FLUBV RNA RESP QL NAA+PROBE: NOT DETECTED
GFR SERPL CREATININE-BSD FRML MDRD: 70 ML/MIN/1.73M*2
GLUCOSE BLDV-MCNC: 101 MG/DL (ref 74–99)
GLUCOSE SERPL-MCNC: 114 MG/DL (ref 74–99)
HCO3 BLDV-SCNC: 32 MMOL/L (ref 22–26)
HCT VFR BLD AUTO: 39 % (ref 36–46)
HCT VFR BLD EST: 40 % (ref 36–46)
HGB BLD-MCNC: 12.9 G/DL (ref 12–16)
HGB BLDV-MCNC: 13.2 G/DL (ref 12–16)
IMM GRANULOCYTES # BLD AUTO: 0.2 X10*3/UL (ref 0–0.7)
IMM GRANULOCYTES NFR BLD AUTO: 1.5 % (ref 0–0.9)
INHALED O2 CONCENTRATION: 0 %
LACTATE BLDV-SCNC: 1.5 MMOL/L (ref 0.4–2)
LIPASE SERPL-CCNC: 51 U/L (ref 9–82)
LYMPHOCYTES # BLD AUTO: 3.85 X10*3/UL (ref 1.2–4.8)
LYMPHOCYTES NFR BLD AUTO: 29.1 %
MAGNESIUM SERPL-MCNC: 2.13 MG/DL (ref 1.6–2.4)
MCH RBC QN AUTO: 28.1 PG (ref 26–34)
MCHC RBC AUTO-ENTMCNC: 33.1 G/DL (ref 32–36)
MCV RBC AUTO: 85 FL (ref 80–100)
MONOCYTES # BLD AUTO: 0.85 X10*3/UL (ref 0.1–1)
MONOCYTES NFR BLD AUTO: 6.4 %
NEUTROPHILS # BLD AUTO: 8.24 X10*3/UL (ref 1.2–7.7)
NEUTROPHILS NFR BLD AUTO: 62.2 %
NRBC BLD-RTO: 0 /100 WBCS (ref 0–0)
OXYHGB MFR BLDV: 51 % (ref 45–75)
PCO2 BLDV: 44 MM HG (ref 41–51)
PH BLDV: 7.47 PH (ref 7.33–7.43)
PLATELET # BLD AUTO: 254 X10*3/UL (ref 150–450)
PO2 BLDV: 32 MM HG (ref 35–45)
POTASSIUM BLDV-SCNC: 3.8 MMOL/L (ref 3.5–5.3)
POTASSIUM SERPL-SCNC: 3.9 MMOL/L (ref 3.5–5.3)
PROT SERPL-MCNC: 6.7 G/DL (ref 6.4–8.2)
RBC # BLD AUTO: 4.59 X10*6/UL (ref 4–5.2)
SAO2 % BLDV: 52 % (ref 45–75)
SARS-COV-2 RNA RESP QL NAA+PROBE: NOT DETECTED
SODIUM BLDV-SCNC: 136 MMOL/L (ref 136–145)
SODIUM SERPL-SCNC: 140 MMOL/L (ref 136–145)
WBC # BLD AUTO: 13.2 X10*3/UL (ref 4.4–11.3)

## 2023-11-08 PROCEDURE — 83880 ASSAY OF NATRIURETIC PEPTIDE: CPT | Performed by: EMERGENCY MEDICINE

## 2023-11-08 PROCEDURE — 84484 ASSAY OF TROPONIN QUANT: CPT | Performed by: EMERGENCY MEDICINE

## 2023-11-08 PROCEDURE — 71045 X-RAY EXAM CHEST 1 VIEW: CPT | Mod: FY,FR

## 2023-11-08 PROCEDURE — 82248 BILIRUBIN DIRECT: CPT | Performed by: EMERGENCY MEDICINE

## 2023-11-08 PROCEDURE — 85025 COMPLETE CBC W/AUTO DIFF WBC: CPT | Performed by: EMERGENCY MEDICINE

## 2023-11-08 PROCEDURE — 99284 EMERGENCY DEPT VISIT MOD MDM: CPT | Mod: 25

## 2023-11-08 PROCEDURE — 83735 ASSAY OF MAGNESIUM: CPT | Performed by: EMERGENCY MEDICINE

## 2023-11-08 PROCEDURE — 94640 AIRWAY INHALATION TREATMENT: CPT

## 2023-11-08 PROCEDURE — 82947 ASSAY GLUCOSE BLOOD QUANT: CPT | Performed by: EMERGENCY MEDICINE

## 2023-11-08 PROCEDURE — 99285 EMERGENCY DEPT VISIT HI MDM: CPT | Mod: 25 | Performed by: EMERGENCY MEDICINE

## 2023-11-08 PROCEDURE — 36415 COLL VENOUS BLD VENIPUNCTURE: CPT | Performed by: EMERGENCY MEDICINE

## 2023-11-08 PROCEDURE — 71045 X-RAY EXAM CHEST 1 VIEW: CPT | Mod: FOREIGN READ | Performed by: RADIOLOGY

## 2023-11-08 PROCEDURE — 83690 ASSAY OF LIPASE: CPT | Performed by: EMERGENCY MEDICINE

## 2023-11-08 PROCEDURE — 83605 ASSAY OF LACTIC ACID: CPT | Performed by: EMERGENCY MEDICINE

## 2023-11-08 PROCEDURE — 87636 SARSCOV2 & INF A&B AMP PRB: CPT | Performed by: EMERGENCY MEDICINE

## 2023-11-08 PROCEDURE — 2500000002 HC RX 250 W HCPCS SELF ADMINISTERED DRUGS (ALT 637 FOR MEDICARE OP, ALT 636 FOR OP/ED): Performed by: EMERGENCY MEDICINE

## 2023-11-08 RX ORDER — IPRATROPIUM BROMIDE AND ALBUTEROL SULFATE 2.5; .5 MG/3ML; MG/3ML
3 SOLUTION RESPIRATORY (INHALATION) ONCE
Status: COMPLETED | OUTPATIENT
Start: 2023-11-08 | End: 2023-11-08

## 2023-11-08 RX ADMIN — IPRATROPIUM BROMIDE AND ALBUTEROL SULFATE 3 ML: .5; 3 SOLUTION RESPIRATORY (INHALATION) at 17:25

## 2023-11-08 ASSESSMENT — LIFESTYLE VARIABLES
HAVE PEOPLE ANNOYED YOU BY CRITICIZING YOUR DRINKING: NO
EVER FELT BAD OR GUILTY ABOUT YOUR DRINKING: NO
REASON UNABLE TO ASSESS: NO
EVER HAD A DRINK FIRST THING IN THE MORNING TO STEADY YOUR NERVES TO GET RID OF A HANGOVER: NO
HAVE YOU EVER FELT YOU SHOULD CUT DOWN ON YOUR DRINKING: NO

## 2023-11-08 ASSESSMENT — PAIN - FUNCTIONAL ASSESSMENT: PAIN_FUNCTIONAL_ASSESSMENT: 0-10

## 2023-11-08 ASSESSMENT — PAIN DESCRIPTION - LOCATION: LOCATION: CHEST

## 2023-11-08 ASSESSMENT — PAIN SCALES - GENERAL: PAINLEVEL_OUTOF10: 6

## 2023-11-08 NOTE — ED PROVIDER NOTES
HPI   Chief Complaint   Patient presents with    Chest Pain     Since Monday.   C/o pain from back into chest.   Feels like a band        HPI:  58-year-old female presents to the emergency department complaining of chest pain.  She has a history of asthma hypertension diabetes and high cholesterol states on Monday night she began having tightness across her chest as well as sharp pain radiating from the front to the back and then again from the back to the front almost like a band underneath her bilateral breast and then she states she is not sure if this is related but she had multiple and multiple episodes of vomiting Monday night she went to bed and the next day was feeling slightly better however again today her chest discomfort recurred and she is having a lot of left anterior chest pressure.  She states she had symptoms similar in the past and was admitted here at [facility] and had a stress test that was negative at that time.  She recently saw her pulmonologist and has been treated for asthma exacerbation and completed her course of antibiotics and is finishing up a course of steroids    Limitations to history: None  Independent Historians: Family at bedside  External Records Reviewed: EMR records  ------------------------------------------------------------------------------------------------------------------------------------------  ROS: a ten point review of systems was performed and negative except as per HPI.  ------------------------------------------------------------------------------------------------------------------------------------------  PMH / PSH: as per HPI, reviewed in EMR and discussed with the patient []  MEDS:  reviewed in EMR and discussed with the patient []  ALLERGIES: reviewed in EMR[]  SocH:  as per HPI, otherwise reviewed in EMR []  FH:  as per HPI, otherwise reviewed in EMR  []  ------------------------------------------------------------------------------------------------------------------------------------------  Physical Exam:  VS: As documented in the triage note and EMR flowsheet from this visit was reviewed  General: Well appearing. No acute distress.   Eyes:  Extraocular movements grossly intact. No scleral icterus.   HEENT: Atraumatic. Normocephalic.    Neck: Supple. No gross masses  CV: RRR, audible S1/S2, 2+ symmetric peripheral pulses  Resp: Clear to auscultation bilaterally. No respiratory distress.  Non-labored respirations  GI: Soft, non-tender, non-distended, no rebound or gaurding  MSK: Symmetric muscle bulk. No gross step offs or deformities.  Skin: Warm, dry, no obvious rash.  Neuro: Speech fluent. Awake. Alert. Appropriate conversation.  Psych: Appropriate mood and affect for situation  ------------------------------------------------------------------------------------------------------------------------------------------  Hospital Course / Medical Decision Making:  Independent Interpretations:  EKG -   Twelve-lead EKG performed and independently interpreted by me as showing sinus rhythm at a ventricular rate of 69 QRS duration of 98 QTc was 445 axis is upright and normal ST segments are flat not elevated no signs of acute ischemia    Patient is well-appearing on my assessment good air exchange on auscultation heart exam was unremarkable.  She was placed on the cardiac monitor peripheral IV access was obtained and labs are drawn.  Blood work was within normal limits lipase was normal electrolytes were normal troponin x2 was negative chest x-ray was clear without any focal infiltrative process and the patient remained hemodynamically stable she was given a DuoNeb breathing treatment and on reevaluation was feeling much better her chest pain had resolved she has been dealing with a flare of her asthma and and is finishing a course of prednisone but by her  pulmonologist and has been having significant coughing spells especially at nighttime and wondering if she does not have a component of bronchospasm contributing to her chest pain especially since she had improvement after the DuoNeb breathing treatment and had some reproducible tenderness along the left anterior chest wall possibly from her coughing and asthma exacerbation.  I offered hospitalization however the patient was feeling much better and requested to be discharged home she states that she will call Dr. Foreman's office tomorrow and schedule follow-up appointment as well as her pulmonologist to get another COVID course of steroids as previously discussed with her pulmonologist.  The patient was advised of strict return precautions that if she gets home has any recurrence of her symptoms feels unwell that she needs to return immediately to the emergency department and she voiced understanding and agreed with the plan of care she was discharged home in stable condition in the care of her family    Patient care discussed with:   Social Determinants affecting care:     Final diagnosis and disposition: Chest pain and bronchospasm            Leila Gould MD                                      No data recorded                Patient History   No past medical history on file.  No past surgical history on file.  No family history on file.  Social History     Tobacco Use    Smoking status: Former     Packs/day: 0.50     Years: 1.00     Additional pack years: 0.00     Total pack years: 0.50     Types: Cigarettes     Quit date:      Years since quittin.8    Smokeless tobacco: Never   Vaping Use    Vaping Use: Never used   Substance Use Topics    Alcohol use: Not on file    Drug use: Never       Physical Exam   ED Triage Vitals [23 1702]   Temp Heart Rate Resp BP   36.3 °C (97.3 °F) 80 16 169/80      SpO2 Temp src Heart Rate Source Patient Position   95 % -- Monitor Sitting      BP Location FiO2 (%)      Left arm --       Physical Exam    ED Course & MDM   Diagnoses as of 11/08/23 1941   Chest pain, unspecified type       Medical Decision Making      Procedure  Procedures     Leila Gould MD  11/08/23 1944     Adequate: hears normal conversation without difficulty

## 2023-11-09 ENCOUNTER — TELEPHONE (OUTPATIENT)
Dept: PRIMARY CARE | Facility: CLINIC | Age: 58
End: 2023-11-09
Payer: COMMERCIAL

## 2023-11-09 ENCOUNTER — TELEPHONE (OUTPATIENT)
Dept: PULMONOLOGY | Facility: HOSPITAL | Age: 58
End: 2023-11-09
Payer: COMMERCIAL

## 2023-11-09 DIAGNOSIS — I10 BENIGN HYPERTENSION: ICD-10-CM

## 2023-11-09 DIAGNOSIS — J45.51 SEVERE PERSISTENT ASTHMA WITH EXACERBATION (MULTI): Primary | ICD-10-CM

## 2023-11-09 RX ORDER — CARVEDILOL 25 MG/1
25 TABLET ORAL
Qty: 180 TABLET | Refills: 0 | Status: ON HOLD | OUTPATIENT
Start: 2023-11-09 | End: 2023-11-30 | Stop reason: SDUPTHER

## 2023-11-09 RX ORDER — LOSARTAN POTASSIUM 100 MG/1
100 TABLET ORAL DAILY
Qty: 90 TABLET | Refills: 0 | Status: SHIPPED | OUTPATIENT
Start: 2023-11-09 | End: 2024-01-22

## 2023-11-09 RX ORDER — CEFDINIR 300 MG/1
300 CAPSULE ORAL 2 TIMES DAILY
Qty: 14 CAPSULE | Refills: 0 | Status: SHIPPED | OUTPATIENT
Start: 2023-11-09 | End: 2023-11-16

## 2023-11-09 RX ORDER — PREDNISONE 20 MG/1
40 TABLET ORAL DAILY
Qty: 14 TABLET | Refills: 0 | Status: SHIPPED | OUTPATIENT
Start: 2023-11-09 | End: 2023-11-16

## 2023-11-09 NOTE — TELEPHONE ENCOUNTER
I called and spoke with patient and instructed her to take the prescriptions as prescribed. Also let her know to call us back if she was still not feeling better. Patient was agreeable to treatment plan and acknowledged understanding.       ----- Message from KRISTEN Dimas sent at 11/9/2023  9:43 AM EST -----  Regarding: RE: Prednisone Request  I sent her another week of prednisone and another antibiotic Cefdinir 1 tablet twice a day for a week.  ----- Message -----  From: Maribel Larkin MA  Sent: 11/9/2023   9:19 AM EST  To: KRISTEN Dimas  Subject: Prednisone Request                               Patient called in stating she still has the cough and is coughing up phlegm. Patient was in the ER yesterday as well. Patient is wondering if we want to send her another prednisone taper?

## 2023-11-13 ENCOUNTER — APPOINTMENT (OUTPATIENT)
Dept: CARDIOLOGY | Facility: CLINIC | Age: 58
End: 2023-11-13
Payer: COMMERCIAL

## 2023-11-13 ENCOUNTER — APPOINTMENT (OUTPATIENT)
Dept: PULMONOLOGY | Facility: HOSPITAL | Age: 58
End: 2023-11-13
Payer: COMMERCIAL

## 2023-11-21 ENCOUNTER — TELEPHONE (OUTPATIENT)
Dept: PRIMARY CARE | Facility: CLINIC | Age: 58
End: 2023-11-21
Payer: COMMERCIAL

## 2023-11-26 ENCOUNTER — APPOINTMENT (OUTPATIENT)
Dept: RADIOLOGY | Facility: HOSPITAL | Age: 58
DRG: 193 | End: 2023-11-26
Payer: COMMERCIAL

## 2023-11-26 ENCOUNTER — HOSPITAL ENCOUNTER (INPATIENT)
Facility: HOSPITAL | Age: 58
LOS: 4 days | Discharge: HOME | DRG: 193 | End: 2023-11-30
Attending: STUDENT IN AN ORGANIZED HEALTH CARE EDUCATION/TRAINING PROGRAM | Admitting: FAMILY MEDICINE
Payer: COMMERCIAL

## 2023-11-26 DIAGNOSIS — J18.9 PNEUMONIA DUE TO INFECTIOUS ORGANISM, UNSPECIFIED LATERALITY, UNSPECIFIED PART OF LUNG: ICD-10-CM

## 2023-11-26 DIAGNOSIS — I10 BENIGN HYPERTENSION: ICD-10-CM

## 2023-11-26 DIAGNOSIS — J18.9 PNEUMONIA OF LEFT LOWER LOBE DUE TO INFECTIOUS ORGANISM: Primary | ICD-10-CM

## 2023-11-26 DIAGNOSIS — J44.9 CHRONIC OBSTRUCTIVE PULMONARY DISEASE, UNSPECIFIED COPD TYPE (MULTI): ICD-10-CM

## 2023-11-26 DIAGNOSIS — G47.33 OSA (OBSTRUCTIVE SLEEP APNEA): ICD-10-CM

## 2023-11-26 DIAGNOSIS — N17.9 AKI (ACUTE KIDNEY INJURY) (CMS-HCC): ICD-10-CM

## 2023-11-26 DIAGNOSIS — J45.20 INTERMITTENT ASTHMA, UNSPECIFIED ASTHMA SEVERITY, UNSPECIFIED WHETHER COMPLICATED (HHS-HCC): ICD-10-CM

## 2023-11-26 LAB
ALBUMIN SERPL BCP-MCNC: 3.7 G/DL (ref 3.4–5)
ALP SERPL-CCNC: 50 U/L (ref 33–110)
ALT SERPL W P-5'-P-CCNC: 23 U/L (ref 7–45)
ANION GAP BLDV CALCULATED.4IONS-SCNC: 6 MMOL/L (ref 10–25)
ANION GAP SERPL CALC-SCNC: 12 MMOL/L (ref 10–20)
AST SERPL W P-5'-P-CCNC: <3 U/L (ref 9–39)
BASE EXCESS BLDV CALC-SCNC: 5.9 MMOL/L (ref -2–3)
BASOPHILS # BLD AUTO: 0.04 X10*3/UL (ref 0–0.1)
BASOPHILS NFR BLD AUTO: 0.3 %
BILIRUB SERPL-MCNC: 1.2 MG/DL (ref 0–1.2)
BNP SERPL-MCNC: 23 PG/ML (ref 0–99)
BODY TEMPERATURE: 37 DEGREES CELSIUS
BUN SERPL-MCNC: 29 MG/DL (ref 6–23)
CA-I BLDV-SCNC: 1.16 MMOL/L (ref 1.1–1.33)
CALCIUM SERPL-MCNC: 9.2 MG/DL (ref 8.6–10.3)
CARDIAC TROPONIN I PNL SERPL HS: 29 NG/L (ref 0–13)
CHLORIDE BLDV-SCNC: 98 MMOL/L (ref 98–107)
CHLORIDE SERPL-SCNC: 93 MMOL/L (ref 98–107)
CO2 SERPL-SCNC: 26 MMOL/L (ref 21–32)
CREAT SERPL-MCNC: 2.05 MG/DL (ref 0.5–1.05)
EOSINOPHIL # BLD AUTO: 0.04 X10*3/UL (ref 0–0.7)
EOSINOPHIL NFR BLD AUTO: 0.3 %
ERYTHROCYTE [DISTWIDTH] IN BLOOD BY AUTOMATED COUNT: 16 % (ref 11.5–14.5)
FLUAV RNA RESP QL NAA+PROBE: NOT DETECTED
FLUBV RNA RESP QL NAA+PROBE: NOT DETECTED
GFR SERPL CREATININE-BSD FRML MDRD: 28 ML/MIN/1.73M*2
GLUCOSE BLD MANUAL STRIP-MCNC: 124 MG/DL (ref 74–99)
GLUCOSE BLD MANUAL STRIP-MCNC: 252 MG/DL (ref 74–99)
GLUCOSE BLDV-MCNC: 129 MG/DL (ref 74–99)
GLUCOSE SERPL-MCNC: <10 MG/DL (ref 74–99)
HCO3 BLDV-SCNC: 32.1 MMOL/L (ref 22–26)
HCT VFR BLD AUTO: 37.9 % (ref 36–46)
HCT VFR BLD EST: 35 % (ref 36–46)
HGB BLD-MCNC: 12.2 G/DL (ref 12–16)
HGB BLDV-MCNC: 11.5 G/DL (ref 12–16)
IMM GRANULOCYTES # BLD AUTO: 0.07 X10*3/UL (ref 0–0.7)
IMM GRANULOCYTES NFR BLD AUTO: 0.5 % (ref 0–0.9)
INHALED O2 CONCENTRATION: 28 %
LACTATE BLDV-SCNC: 1.3 MMOL/L (ref 0.4–2)
LYMPHOCYTES # BLD AUTO: 2.07 X10*3/UL (ref 1.2–4.8)
LYMPHOCYTES NFR BLD AUTO: 16.1 %
MAGNESIUM SERPL-MCNC: 2.25 MG/DL (ref 1.6–2.4)
MCH RBC QN AUTO: 28 PG (ref 26–34)
MCHC RBC AUTO-ENTMCNC: 32.2 G/DL (ref 32–36)
MCV RBC AUTO: 87 FL (ref 80–100)
MONOCYTES # BLD AUTO: 1.19 X10*3/UL (ref 0.1–1)
MONOCYTES NFR BLD AUTO: 9.3 %
NEUTROPHILS # BLD AUTO: 9.41 X10*3/UL (ref 1.2–7.7)
NEUTROPHILS NFR BLD AUTO: 73.5 %
NRBC BLD-RTO: 0 /100 WBCS (ref 0–0)
OXYHGB MFR BLDV: 37.5 % (ref 45–75)
PCO2 BLDV: 53 MM HG (ref 41–51)
PH BLDV: 7.39 PH (ref 7.33–7.43)
PLATELET # BLD AUTO: 219 X10*3/UL (ref 150–450)
PO2 BLDV: 32 MM HG (ref 35–45)
POTASSIUM BLDV-SCNC: 4.4 MMOL/L (ref 3.5–5.3)
POTASSIUM SERPL-SCNC: 4.9 MMOL/L (ref 3.5–5.3)
PROT SERPL-MCNC: 7 G/DL (ref 6.4–8.2)
RBC # BLD AUTO: 4.35 X10*6/UL (ref 4–5.2)
SAO2 % BLDV: 38 % (ref 45–75)
SARS-COV-2 RNA RESP QL NAA+PROBE: NOT DETECTED
SODIUM BLDV-SCNC: 132 MMOL/L (ref 136–145)
SODIUM SERPL-SCNC: 126 MMOL/L (ref 136–145)
WBC # BLD AUTO: 12.8 X10*3/UL (ref 4.4–11.3)

## 2023-11-26 PROCEDURE — 2500000004 HC RX 250 GENERAL PHARMACY W/ HCPCS (ALT 636 FOR OP/ED): Performed by: STUDENT IN AN ORGANIZED HEALTH CARE EDUCATION/TRAINING PROGRAM

## 2023-11-26 PROCEDURE — 36415 COLL VENOUS BLD VENIPUNCTURE: CPT | Performed by: STUDENT IN AN ORGANIZED HEALTH CARE EDUCATION/TRAINING PROGRAM

## 2023-11-26 PROCEDURE — 83880 ASSAY OF NATRIURETIC PEPTIDE: CPT | Performed by: STUDENT IN AN ORGANIZED HEALTH CARE EDUCATION/TRAINING PROGRAM

## 2023-11-26 PROCEDURE — 71045 X-RAY EXAM CHEST 1 VIEW: CPT | Performed by: RADIOLOGY

## 2023-11-26 PROCEDURE — 1100000001 HC PRIVATE ROOM DAILY

## 2023-11-26 PROCEDURE — 96372 THER/PROPH/DIAG INJ SC/IM: CPT | Mod: 25

## 2023-11-26 PROCEDURE — 2500000002 HC RX 250 W HCPCS SELF ADMINISTERED DRUGS (ALT 637 FOR MEDICARE OP, ALT 636 FOR OP/ED): Performed by: STUDENT IN AN ORGANIZED HEALTH CARE EDUCATION/TRAINING PROGRAM

## 2023-11-26 PROCEDURE — 82435 ASSAY OF BLOOD CHLORIDE: CPT | Performed by: STUDENT IN AN ORGANIZED HEALTH CARE EDUCATION/TRAINING PROGRAM

## 2023-11-26 PROCEDURE — 84484 ASSAY OF TROPONIN QUANT: CPT | Performed by: STUDENT IN AN ORGANIZED HEALTH CARE EDUCATION/TRAINING PROGRAM

## 2023-11-26 PROCEDURE — 83735 ASSAY OF MAGNESIUM: CPT | Performed by: STUDENT IN AN ORGANIZED HEALTH CARE EDUCATION/TRAINING PROGRAM

## 2023-11-26 PROCEDURE — 2500000002 HC RX 250 W HCPCS SELF ADMINISTERED DRUGS (ALT 637 FOR MEDICARE OP, ALT 636 FOR OP/ED): Performed by: FAMILY MEDICINE

## 2023-11-26 PROCEDURE — 82947 ASSAY GLUCOSE BLOOD QUANT: CPT

## 2023-11-26 PROCEDURE — 2500000004 HC RX 250 GENERAL PHARMACY W/ HCPCS (ALT 636 FOR OP/ED): Performed by: FAMILY MEDICINE

## 2023-11-26 PROCEDURE — 71045 X-RAY EXAM CHEST 1 VIEW: CPT | Mod: FY

## 2023-11-26 PROCEDURE — 80053 COMPREHEN METABOLIC PANEL: CPT | Performed by: STUDENT IN AN ORGANIZED HEALTH CARE EDUCATION/TRAINING PROGRAM

## 2023-11-26 PROCEDURE — 96367 TX/PROPH/DG ADDL SEQ IV INF: CPT

## 2023-11-26 PROCEDURE — 85025 COMPLETE CBC W/AUTO DIFF WBC: CPT | Performed by: STUDENT IN AN ORGANIZED HEALTH CARE EDUCATION/TRAINING PROGRAM

## 2023-11-26 PROCEDURE — 87636 SARSCOV2 & INF A&B AMP PRB: CPT | Performed by: STUDENT IN AN ORGANIZED HEALTH CARE EDUCATION/TRAINING PROGRAM

## 2023-11-26 PROCEDURE — 96376 TX/PRO/DX INJ SAME DRUG ADON: CPT

## 2023-11-26 PROCEDURE — 99223 1ST HOSP IP/OBS HIGH 75: CPT | Performed by: FAMILY MEDICINE

## 2023-11-26 PROCEDURE — 2500000001 HC RX 250 WO HCPCS SELF ADMINISTERED DRUGS (ALT 637 FOR MEDICARE OP): Performed by: FAMILY MEDICINE

## 2023-11-26 PROCEDURE — 85018 HEMOGLOBIN: CPT | Performed by: STUDENT IN AN ORGANIZED HEALTH CARE EDUCATION/TRAINING PROGRAM

## 2023-11-26 PROCEDURE — 96365 THER/PROPH/DIAG IV INF INIT: CPT | Mod: 59

## 2023-11-26 PROCEDURE — 96372 THER/PROPH/DIAG INJ SC/IM: CPT | Performed by: FAMILY MEDICINE

## 2023-11-26 PROCEDURE — 99285 EMERGENCY DEPT VISIT HI MDM: CPT | Performed by: STUDENT IN AN ORGANIZED HEALTH CARE EDUCATION/TRAINING PROGRAM

## 2023-11-26 PROCEDURE — 96375 TX/PRO/DX INJ NEW DRUG ADDON: CPT

## 2023-11-26 RX ORDER — ONDANSETRON HYDROCHLORIDE 2 MG/ML
4 INJECTION, SOLUTION INTRAVENOUS EVERY 8 HOURS PRN
Status: DISCONTINUED | OUTPATIENT
Start: 2023-11-26 | End: 2023-11-30 | Stop reason: HOSPADM

## 2023-11-26 RX ORDER — PANTOPRAZOLE SODIUM 40 MG/1
40 TABLET, DELAYED RELEASE ORAL
Status: DISCONTINUED | OUTPATIENT
Start: 2023-11-27 | End: 2023-11-30 | Stop reason: HOSPADM

## 2023-11-26 RX ORDER — MONTELUKAST SODIUM 10 MG/1
10 TABLET ORAL NIGHTLY
Status: DISCONTINUED | OUTPATIENT
Start: 2023-11-26 | End: 2023-11-30 | Stop reason: HOSPADM

## 2023-11-26 RX ORDER — BENZONATATE 100 MG/1
200 CAPSULE ORAL 3 TIMES DAILY PRN
Status: DISCONTINUED | OUTPATIENT
Start: 2023-11-26 | End: 2023-11-30 | Stop reason: HOSPADM

## 2023-11-26 RX ORDER — ALBUTEROL SULFATE 90 UG/1
2 AEROSOL, METERED RESPIRATORY (INHALATION) EVERY 4 HOURS PRN
Status: DISCONTINUED | OUTPATIENT
Start: 2023-11-26 | End: 2023-11-30 | Stop reason: HOSPADM

## 2023-11-26 RX ORDER — ACETAMINOPHEN 325 MG/1
650 TABLET ORAL EVERY 4 HOURS PRN
Status: DISCONTINUED | OUTPATIENT
Start: 2023-11-26 | End: 2023-11-30 | Stop reason: HOSPADM

## 2023-11-26 RX ORDER — POLYETHYLENE GLYCOL 3350 17 G/17G
17 POWDER, FOR SOLUTION ORAL DAILY
Status: DISCONTINUED | OUTPATIENT
Start: 2023-11-26 | End: 2023-11-30 | Stop reason: HOSPADM

## 2023-11-26 RX ORDER — ACETAMINOPHEN 650 MG/1
650 SUPPOSITORY RECTAL EVERY 4 HOURS PRN
Status: DISCONTINUED | OUTPATIENT
Start: 2023-11-26 | End: 2023-11-30 | Stop reason: HOSPADM

## 2023-11-26 RX ORDER — LORATADINE 10 MG/1
10 TABLET ORAL DAILY
Status: DISCONTINUED | OUTPATIENT
Start: 2023-11-26 | End: 2023-11-30 | Stop reason: HOSPADM

## 2023-11-26 RX ORDER — PANTOPRAZOLE SODIUM 40 MG/10ML
40 INJECTION, POWDER, LYOPHILIZED, FOR SOLUTION INTRAVENOUS
Status: DISCONTINUED | OUTPATIENT
Start: 2023-11-27 | End: 2023-11-30 | Stop reason: HOSPADM

## 2023-11-26 RX ORDER — TALC
3 POWDER (GRAM) TOPICAL DAILY
Status: DISCONTINUED | OUTPATIENT
Start: 2023-11-26 | End: 2023-11-30 | Stop reason: HOSPADM

## 2023-11-26 RX ORDER — CARVEDILOL 25 MG/1
25 TABLET ORAL
Status: DISCONTINUED | OUTPATIENT
Start: 2023-11-26 | End: 2023-11-30 | Stop reason: HOSPADM

## 2023-11-26 RX ORDER — DEXTROSE 50 % IN WATER (D50W) INTRAVENOUS SYRINGE
Status: DISCONTINUED
Start: 2023-11-26 | End: 2023-11-26 | Stop reason: WASHOUT

## 2023-11-26 RX ORDER — GUAIFENESIN 600 MG/1
1200 TABLET, EXTENDED RELEASE ORAL EVERY 12 HOURS PRN
Status: DISCONTINUED | OUTPATIENT
Start: 2023-11-26 | End: 2023-11-30 | Stop reason: HOSPADM

## 2023-11-26 RX ORDER — FLUTICASONE FUROATE AND VILANTEROL 200; 25 UG/1; UG/1
1 POWDER RESPIRATORY (INHALATION)
Status: DISCONTINUED | OUTPATIENT
Start: 2023-11-26 | End: 2023-11-30 | Stop reason: HOSPADM

## 2023-11-26 RX ORDER — IPRATROPIUM BROMIDE AND ALBUTEROL SULFATE 2.5; .5 MG/3ML; MG/3ML
3 SOLUTION RESPIRATORY (INHALATION) ONCE
Status: COMPLETED | OUTPATIENT
Start: 2023-11-26 | End: 2023-11-26

## 2023-11-26 RX ORDER — AZELASTINE 1 MG/ML
1 SPRAY, METERED NASAL 2 TIMES DAILY
Status: DISCONTINUED | OUTPATIENT
Start: 2023-11-26 | End: 2023-11-30 | Stop reason: HOSPADM

## 2023-11-26 RX ORDER — LOSARTAN POTASSIUM 50 MG/1
100 TABLET ORAL DAILY
Status: DISCONTINUED | OUTPATIENT
Start: 2023-11-26 | End: 2023-11-29 | Stop reason: SDUPTHER

## 2023-11-26 RX ORDER — ONDANSETRON 4 MG/1
4 TABLET, FILM COATED ORAL EVERY 8 HOURS PRN
Status: DISCONTINUED | OUTPATIENT
Start: 2023-11-26 | End: 2023-11-30 | Stop reason: HOSPADM

## 2023-11-26 RX ORDER — SODIUM CHLORIDE 9 MG/ML
100 INJECTION, SOLUTION INTRAVENOUS CONTINUOUS
Status: ACTIVE | OUTPATIENT
Start: 2023-11-26 | End: 2023-11-27

## 2023-11-26 RX ORDER — IPRATROPIUM BROMIDE AND ALBUTEROL SULFATE 2.5; .5 MG/3ML; MG/3ML
3 SOLUTION RESPIRATORY (INHALATION) EVERY 4 HOURS PRN
Status: DISCONTINUED | OUTPATIENT
Start: 2023-11-26 | End: 2023-11-30 | Stop reason: HOSPADM

## 2023-11-26 RX ORDER — ASPIRIN 81 MG/1
81 TABLET ORAL DAILY
Status: DISCONTINUED | OUTPATIENT
Start: 2023-11-26 | End: 2023-11-30 | Stop reason: HOSPADM

## 2023-11-26 RX ORDER — HEPARIN SODIUM 5000 [USP'U]/ML
7500 INJECTION, SOLUTION INTRAVENOUS; SUBCUTANEOUS EVERY 8 HOURS SCHEDULED
Status: DISCONTINUED | OUTPATIENT
Start: 2023-11-26 | End: 2023-11-30 | Stop reason: HOSPADM

## 2023-11-26 RX ORDER — ACETAMINOPHEN 160 MG/5ML
650 SUSPENSION ORAL EVERY 4 HOURS PRN
Status: DISCONTINUED | OUTPATIENT
Start: 2023-11-26 | End: 2023-11-30 | Stop reason: HOSPADM

## 2023-11-26 RX ORDER — THEOPHYLLINE 400 MG/1
400 TABLET, EXTENDED RELEASE ORAL DAILY
Status: DISCONTINUED | OUTPATIENT
Start: 2023-11-26 | End: 2023-11-30 | Stop reason: HOSPADM

## 2023-11-26 RX ORDER — MAGNESIUM SULFATE HEPTAHYDRATE 40 MG/ML
2 INJECTION, SOLUTION INTRAVENOUS ONCE
Status: COMPLETED | OUTPATIENT
Start: 2023-11-26 | End: 2023-11-26

## 2023-11-26 RX ORDER — FLUTICASONE PROPIONATE 50 MCG
1 SPRAY, SUSPENSION (ML) NASAL 2 TIMES DAILY
Status: DISCONTINUED | OUTPATIENT
Start: 2023-11-26 | End: 2023-11-30 | Stop reason: HOSPADM

## 2023-11-26 RX ORDER — CEFTRIAXONE 2 G/50ML
2 INJECTION, SOLUTION INTRAVENOUS EVERY 24 HOURS
Status: DISCONTINUED | OUTPATIENT
Start: 2023-11-26 | End: 2023-11-30 | Stop reason: HOSPADM

## 2023-11-26 RX ADMIN — MAGNESIUM SULFATE HEPTAHYDRATE 2 G: 40 INJECTION, SOLUTION INTRAVENOUS at 08:30

## 2023-11-26 RX ADMIN — METHYLPREDNISOLONE SODIUM SUCCINATE 125 MG: 125 INJECTION, POWDER, FOR SOLUTION INTRAMUSCULAR; INTRAVENOUS at 08:29

## 2023-11-26 RX ADMIN — BENZONATATE 200 MG: 100 CAPSULE ORAL at 16:32

## 2023-11-26 RX ADMIN — CARVEDILOL 25 MG: 25 TABLET, FILM COATED ORAL at 19:00

## 2023-11-26 RX ADMIN — HEPARIN SODIUM 7500 UNITS: 5000 INJECTION INTRAVENOUS; SUBCUTANEOUS at 20:19

## 2023-11-26 RX ADMIN — METHYLPREDNISOLONE SODIUM SUCCINATE 40 MG: 40 INJECTION, POWDER, LYOPHILIZED, FOR SOLUTION INTRAMUSCULAR; INTRAVENOUS at 19:00

## 2023-11-26 RX ADMIN — CEFTRIAXONE SODIUM 2 G: 2 INJECTION, SOLUTION INTRAVENOUS at 10:35

## 2023-11-26 RX ADMIN — ASPIRIN 81 MG: 81 TABLET, COATED ORAL at 13:59

## 2023-11-26 RX ADMIN — TIOTROPIUM BROMIDE INHALATION SPRAY 2 PUFF: 3.12 SPRAY, METERED RESPIRATORY (INHALATION) at 13:58

## 2023-11-26 RX ADMIN — LORATADINE 10 MG: 10 TABLET ORAL at 14:00

## 2023-11-26 RX ADMIN — ALBUTEROL SULFATE 2 PUFF: 90 AEROSOL, METERED RESPIRATORY (INHALATION) at 13:52

## 2023-11-26 RX ADMIN — METHYLPREDNISOLONE SODIUM SUCCINATE 40 MG: 40 INJECTION, POWDER, LYOPHILIZED, FOR SOLUTION INTRAMUSCULAR; INTRAVENOUS at 11:26

## 2023-11-26 RX ADMIN — FLUTICASONE FUROATE AND VILANTEROL TRIFENATATE 1 PUFF: 200; 25 POWDER RESPIRATORY (INHALATION) at 12:50

## 2023-11-26 RX ADMIN — THEOPHYLLINE 400 MG: 400 TABLET, EXTENDED RELEASE ORAL at 14:00

## 2023-11-26 RX ADMIN — HEPARIN SODIUM 7500 UNITS: 5000 INJECTION INTRAVENOUS; SUBCUTANEOUS at 14:03

## 2023-11-26 RX ADMIN — MONTELUKAST 10 MG: 10 TABLET, FILM COATED ORAL at 20:17

## 2023-11-26 RX ADMIN — IPRATROPIUM BROMIDE AND ALBUTEROL SULFATE 3 ML: 2.5; .5 SOLUTION RESPIRATORY (INHALATION) at 08:30

## 2023-11-26 RX ADMIN — AZITHROMYCIN MONOHYDRATE 500 MG: 500 INJECTION, POWDER, LYOPHILIZED, FOR SOLUTION INTRAVENOUS at 11:26

## 2023-11-26 RX ADMIN — SODIUM CHLORIDE 100 ML/HR: 9 INJECTION, SOLUTION INTRAVENOUS at 15:46

## 2023-11-26 RX ADMIN — LOSARTAN POTASSIUM 100 MG: 50 TABLET, FILM COATED ORAL at 14:01

## 2023-11-26 SDOH — SOCIAL STABILITY: SOCIAL INSECURITY: ARE YOU OR HAVE YOU BEEN THREATENED OR ABUSED PHYSICALLY, EMOTIONALLY, OR SEXUALLY BY ANYONE?: NO

## 2023-11-26 SDOH — SOCIAL STABILITY: SOCIAL INSECURITY: DO YOU FEEL ANYONE HAS EXPLOITED OR TAKEN ADVANTAGE OF YOU FINANCIALLY OR OF YOUR PERSONAL PROPERTY?: NO

## 2023-11-26 SDOH — SOCIAL STABILITY: SOCIAL INSECURITY: ARE THERE ANY APPARENT SIGNS OF INJURIES/BEHAVIORS THAT COULD BE RELATED TO ABUSE/NEGLECT?: NO

## 2023-11-26 SDOH — SOCIAL STABILITY: SOCIAL INSECURITY: DOES ANYONE TRY TO KEEP YOU FROM HAVING/CONTACTING OTHER FRIENDS OR DOING THINGS OUTSIDE YOUR HOME?: NO

## 2023-11-26 SDOH — SOCIAL STABILITY: SOCIAL INSECURITY: ABUSE: ADULT

## 2023-11-26 SDOH — SOCIAL STABILITY: SOCIAL INSECURITY: WERE YOU ABLE TO COMPLETE ALL THE BEHAVIORAL HEALTH SCREENINGS?: YES

## 2023-11-26 SDOH — SOCIAL STABILITY: SOCIAL INSECURITY: HAS ANYONE EVER THREATENED TO HURT YOUR FAMILY OR YOUR PETS?: NO

## 2023-11-26 SDOH — SOCIAL STABILITY: SOCIAL INSECURITY: HAVE YOU HAD THOUGHTS OF HARMING ANYONE ELSE?: NO

## 2023-11-26 SDOH — SOCIAL STABILITY: SOCIAL INSECURITY: DO YOU FEEL UNSAFE GOING BACK TO THE PLACE WHERE YOU ARE LIVING?: NO

## 2023-11-26 ASSESSMENT — COGNITIVE AND FUNCTIONAL STATUS - GENERAL
DAILY ACTIVITIY SCORE: 24
PATIENT BASELINE BEDBOUND: NO
MOBILITY SCORE: 24
MOBILITY SCORE: 24
DAILY ACTIVITIY SCORE: 24

## 2023-11-26 ASSESSMENT — ENCOUNTER SYMPTOMS
NUMBNESS: 0
WEAKNESS: 0
JOINT SWELLING: 0
UNEXPECTED WEIGHT CHANGE: 0
LIGHT-HEADEDNESS: 0
ABDOMINAL PAIN: 0
HEADACHES: 0
NECK STIFFNESS: 0
COUGH: 1
ARTHRALGIAS: 0
WHEEZING: 1
DYSURIA: 0
BLOOD IN STOOL: 0
PALPITATIONS: 0
DIFFICULTY URINATING: 0
PHOTOPHOBIA: 0
DIARRHEA: 0
POLYDIPSIA: 0
AGITATION: 0
MYALGIAS: 0
SHORTNESS OF BREATH: 1
HEMATURIA: 0
CHEST TIGHTNESS: 1
BACK PAIN: 0
NAUSEA: 0
NERVOUS/ANXIOUS: 0
DIZZINESS: 0
CONFUSION: 0
SINUS PAIN: 0
FLANK PAIN: 0
APPETITE CHANGE: 1
HALLUCINATIONS: 0
FREQUENCY: 0
CARDIOVASCULAR NEGATIVE: 1
BRUISES/BLEEDS EASILY: 0
VOMITING: 0
SORE THROAT: 0
RHINORRHEA: 0

## 2023-11-26 ASSESSMENT — ACTIVITIES OF DAILY LIVING (ADL)
GROOMING: INDEPENDENT
DRESSING YOURSELF: INDEPENDENT
HEARING - LEFT EAR: FUNCTIONAL
HEARING - RIGHT EAR: FUNCTIONAL
FEEDING YOURSELF: INDEPENDENT
PATIENT'S MEMORY ADEQUATE TO SAFELY COMPLETE DAILY ACTIVITIES?: YES
ASSISTIVE_DEVICE: EYEGLASSES;CONTACTS
BATHING: INDEPENDENT
WALKS IN HOME: INDEPENDENT
JUDGMENT_ADEQUATE_SAFELY_COMPLETE_DAILY_ACTIVITIES: YES
ADEQUATE_TO_COMPLETE_ADL: YES
LACK_OF_TRANSPORTATION: NO
TOILETING: INDEPENDENT

## 2023-11-26 ASSESSMENT — PATIENT HEALTH QUESTIONNAIRE - PHQ9
1. LITTLE INTEREST OR PLEASURE IN DOING THINGS: NOT AT ALL
SUM OF ALL RESPONSES TO PHQ9 QUESTIONS 1 & 2: 1
2. FEELING DOWN, DEPRESSED OR HOPELESS: SEVERAL DAYS

## 2023-11-26 ASSESSMENT — LIFESTYLE VARIABLES
HOW OFTEN DO YOU HAVE 6 OR MORE DRINKS ON ONE OCCASION: NEVER
AUDIT-C TOTAL SCORE: 0
HOW MANY STANDARD DRINKS CONTAINING ALCOHOL DO YOU HAVE ON A TYPICAL DAY: PATIENT DOES NOT DRINK
AUDIT-C TOTAL SCORE: 0
SKIP TO QUESTIONS 9-10: 1
HOW OFTEN DO YOU HAVE A DRINK CONTAINING ALCOHOL: NEVER

## 2023-11-26 ASSESSMENT — PAIN SCALES - GENERAL
PAINLEVEL_OUTOF10: 0 - NO PAIN
PAINLEVEL_OUTOF10: 0 - NO PAIN

## 2023-11-26 ASSESSMENT — COLUMBIA-SUICIDE SEVERITY RATING SCALE - C-SSRS
2. HAVE YOU ACTUALLY HAD ANY THOUGHTS OF KILLING YOURSELF?: NO
1. IN THE PAST MONTH, HAVE YOU WISHED YOU WERE DEAD OR WISHED YOU COULD GO TO SLEEP AND NOT WAKE UP?: NO
6. HAVE YOU EVER DONE ANYTHING, STARTED TO DO ANYTHING, OR PREPARED TO DO ANYTHING TO END YOUR LIFE?: NO

## 2023-11-26 ASSESSMENT — PAIN - FUNCTIONAL ASSESSMENT
PAIN_FUNCTIONAL_ASSESSMENT: 0-10
PAIN_FUNCTIONAL_ASSESSMENT: 0-10

## 2023-11-26 NOTE — ED PROVIDER NOTES
HPI   Chief Complaint   Patient presents with    Cough     Pt has had a cough since October and has been on several rounds of medication-now coughing up green mucous       HPI     Patient 58-year-old female present to the emergency department with a cough since October.  She has had several rounds of medications, follows with a pulmonologist outpatient for history of asthma.  She states that in her history of asthma she was started on medications including she takes her scheduled trilogy and also has a rescue inhaler which she has been using every 4 hours.  She seen 3 separate providers including an urgent care provider, her pulmonologist and primary care doctor.  She has been on multiple rounds of steroids as well as multiple rounds of antibiotics, unsure what specifically which antibiotics but she has finished all of these treatments and is still symptomatic.  Her symptoms have been worsening and she includes exertional dyspnea and her symptoms.  Given her continued symptoms, cough that has been productive from whitish to now greenish phlegm came in today for evaluation.               [place] Coma Scale Score: 15                  Patient History   No past medical history on file.  No past surgical history on file.  No family history on file.  Social History     Tobacco Use    Smoking status: Former     Packs/day: 0.50     Years: 1.00     Additional pack years: 0.00     Total pack years: 0.50     Types: Cigarettes     Quit date: 2000     Years since quittin.9    Smokeless tobacco: Never   Vaping Use    Vaping Use: Never used   Substance Use Topics    Alcohol use: Not on file    Drug use: Never       Physical Exam   ED Triage Vitals [23 0710]   Temp Heart Rate Resp BP   37.4 °C (99.4 °F) 98 24 112/62      SpO2 Temp Source Heart Rate Source Patient Position   (!) 88 % Temporal Monitor --      BP Location FiO2 (%)     -- --       Physical Exam  Vitals and nursing note reviewed.   Constitutional:        General: She is not in acute distress.     Appearance: She is well-developed.   HENT:      Head: Normocephalic and atraumatic.   Eyes:      Conjunctiva/sclera: Conjunctivae normal.   Cardiovascular:      Rate and Rhythm: Normal rate and regular rhythm.      Heart sounds: No murmur heard.  Pulmonary:      Effort: Pulmonary effort is normal. No tachypnea or respiratory distress.      Breath sounds: Examination of the right-lower field reveals wheezing and rhonchi. Examination of the left-lower field reveals wheezing and rhonchi. Wheezing and rhonchi present.   Abdominal:      Palpations: Abdomen is soft.      Tenderness: There is no abdominal tenderness.   Musculoskeletal:         General: No swelling.      Cervical back: Neck supple.   Skin:     General: Skin is warm and dry.      Capillary Refill: Capillary refill takes less than 2 seconds.   Neurological:      Mental Status: She is alert.   Psychiatric:         Mood and Affect: Mood normal.         ED Course & MDM   Diagnoses as of 11/27/23 0842   Pneumonia of left lower lobe due to infectious organism       Medical Decision Making    Patient is a 58-year-old female present to the emergency department today in the setting of shortness of breath, productive cough.  On arrival here I toxic at 88% on room air and started on 2 L nasal cannula with improvement to 94%.  On bedside assessment she has evidence of rhonchi's in the lower lungs as well as some expiratory wheezing will be tried on a course of DuoNebs, magnesium and steroids here.  In the setting of continued shortness of breath with cough x-ray, CBC, CMP, viral swabs and a venous blood gas, troponin and BNP will be obtained for further evaluation.    Patient's workup reviewed, notable for left lung pneumonia started on Rocephin and azithromycin, will be admitted for continued workup for other possible etiologies of patient's pneumonia and monitoring for improvement.  Blood culture sent off.  Patient updated  and agreeable with plan.  Admitted for continued care and management.  Procedure  Procedures     Magdalena Hackett MD  11/27/23 5628

## 2023-11-26 NOTE — CARE PLAN
The patient's goals for the shift include        Problem: Fall/Injury  Goal: Not fall by end of shift  Outcome: Progressing  Goal: Be free from injury by end of the shift  Outcome: Progressing  Goal: Verbalize understanding of personal risk factors for fall in the hospital  Outcome: Progressing  Goal: Verbalize understanding of risk factor reduction measures to prevent injury from fall in the home  Outcome: Progressing  Goal: Use assistive devices by end of the shift  Outcome: Progressing  Goal: Pace activities to prevent fatigue by end of the shift  Outcome: Progressing     Problem: Diabetes  Goal: Achieve decreasing blood glucose levels by end of shift  Outcome: Progressing  Goal: Increase stability of blood glucose readings by end of shift  Outcome: Progressing  Goal: Decrease in ketones present in urine by end of shift  Outcome: Progressing  Goal: Maintain electrolyte levels within acceptable range throughout shift  Outcome: Progressing  Goal: Maintain glucose levels >70mg/dl to <250mg/dl throughout shift  Outcome: Progressing  Goal: No changes in neurological exam by end of shift  Outcome: Progressing  Goal: Learn about and adhere to nutrition recommendations by end of shift  Outcome: Progressing  Goal: Vital signs within normal range for age by end of shift  Outcome: Progressing  Goal: Increase self care and/or family involovement by end of shift  Outcome: Progressing  Goal: Receive DSME education by end of shift  Outcome: Progressing     The clinical goals for the shift include no goal    New admit at this time. See assessment and mar.

## 2023-11-27 LAB
ANION GAP SERPL CALC-SCNC: 12 MMOL/L (ref 10–20)
APPEARANCE UR: CLEAR
BILIRUB UR STRIP.AUTO-MCNC: NEGATIVE MG/DL
BUN SERPL-MCNC: 33 MG/DL (ref 6–23)
CALCIUM SERPL-MCNC: 8 MG/DL (ref 8.6–10.3)
CHLORIDE SERPL-SCNC: 100 MMOL/L (ref 98–107)
CO2 SERPL-SCNC: 25 MMOL/L (ref 21–32)
COLOR UR: YELLOW
CREAT SERPL-MCNC: 1.48 MG/DL (ref 0.5–1.05)
ERYTHROCYTE [DISTWIDTH] IN BLOOD BY AUTOMATED COUNT: 15.4 % (ref 11.5–14.5)
GFR SERPL CREATININE-BSD FRML MDRD: 41 ML/MIN/1.73M*2
GLUCOSE BLD MANUAL STRIP-MCNC: 221 MG/DL (ref 74–99)
GLUCOSE BLD MANUAL STRIP-MCNC: 229 MG/DL (ref 74–99)
GLUCOSE BLD MANUAL STRIP-MCNC: 236 MG/DL (ref 74–99)
GLUCOSE BLD MANUAL STRIP-MCNC: 262 MG/DL (ref 74–99)
GLUCOSE BLD MANUAL STRIP-MCNC: 304 MG/DL (ref 74–99)
GLUCOSE SERPL-MCNC: 224 MG/DL (ref 74–99)
GLUCOSE UR STRIP.AUTO-MCNC: ABNORMAL MG/DL
HCT VFR BLD AUTO: 33 % (ref 36–46)
HGB BLD-MCNC: 10.9 G/DL (ref 12–16)
KETONES UR STRIP.AUTO-MCNC: NEGATIVE MG/DL
LEGIONELLA AG UR QL: NEGATIVE
LEUKOCYTE ESTERASE UR QL STRIP.AUTO: NEGATIVE
MCH RBC QN AUTO: 28.5 PG (ref 26–34)
MCHC RBC AUTO-ENTMCNC: 33 G/DL (ref 32–36)
MCV RBC AUTO: 86 FL (ref 80–100)
NITRITE UR QL STRIP.AUTO: NEGATIVE
NRBC BLD-RTO: 0 /100 WBCS (ref 0–0)
PH UR STRIP.AUTO: 5 [PH]
PLATELET # BLD AUTO: 222 X10*3/UL (ref 150–450)
POTASSIUM SERPL-SCNC: 4.5 MMOL/L (ref 3.5–5.3)
PROT UR STRIP.AUTO-MCNC: NEGATIVE MG/DL
RBC # BLD AUTO: 3.83 X10*6/UL (ref 4–5.2)
RBC # UR STRIP.AUTO: NEGATIVE /UL
S PNEUM AG UR QL: NEGATIVE
SODIUM SERPL-SCNC: 132 MMOL/L (ref 136–145)
SP GR UR STRIP.AUTO: 1.01
UROBILINOGEN UR STRIP.AUTO-MCNC: <2 MG/DL
WBC # BLD AUTO: 12.9 X10*3/UL (ref 4.4–11.3)

## 2023-11-27 PROCEDURE — 87449 NOS EACH ORGANISM AG IA: CPT | Mod: PORLAB | Performed by: FAMILY MEDICINE

## 2023-11-27 PROCEDURE — 2500000004 HC RX 250 GENERAL PHARMACY W/ HCPCS (ALT 636 FOR OP/ED): Performed by: FAMILY MEDICINE

## 2023-11-27 PROCEDURE — 2500000001 HC RX 250 WO HCPCS SELF ADMINISTERED DRUGS (ALT 637 FOR MEDICARE OP): Performed by: INTERNAL MEDICINE

## 2023-11-27 PROCEDURE — 36415 COLL VENOUS BLD VENIPUNCTURE: CPT | Performed by: FAMILY MEDICINE

## 2023-11-27 PROCEDURE — 87899 AGENT NOS ASSAY W/OPTIC: CPT | Mod: PORLAB | Performed by: FAMILY MEDICINE

## 2023-11-27 PROCEDURE — 2500000002 HC RX 250 W HCPCS SELF ADMINISTERED DRUGS (ALT 637 FOR MEDICARE OP, ALT 636 FOR OP/ED): Performed by: FAMILY MEDICINE

## 2023-11-27 PROCEDURE — 96372 THER/PROPH/DIAG INJ SC/IM: CPT | Performed by: FAMILY MEDICINE

## 2023-11-27 PROCEDURE — 82947 ASSAY GLUCOSE BLOOD QUANT: CPT

## 2023-11-27 PROCEDURE — 85027 COMPLETE CBC AUTOMATED: CPT | Performed by: FAMILY MEDICINE

## 2023-11-27 PROCEDURE — 99232 SBSQ HOSP IP/OBS MODERATE 35: CPT | Performed by: HOSPITALIST

## 2023-11-27 PROCEDURE — 1100000001 HC PRIVATE ROOM DAILY

## 2023-11-27 PROCEDURE — 2500000001 HC RX 250 WO HCPCS SELF ADMINISTERED DRUGS (ALT 637 FOR MEDICARE OP): Performed by: FAMILY MEDICINE

## 2023-11-27 PROCEDURE — 94640 AIRWAY INHALATION TREATMENT: CPT

## 2023-11-27 PROCEDURE — 81003 URINALYSIS AUTO W/O SCOPE: CPT | Performed by: NURSE PRACTITIONER

## 2023-11-27 PROCEDURE — 80048 BASIC METABOLIC PNL TOTAL CA: CPT | Performed by: FAMILY MEDICINE

## 2023-11-27 RX ORDER — CALCIUM CARBONATE 200(500)MG
500 TABLET,CHEWABLE ORAL 4 TIMES DAILY PRN
Status: DISCONTINUED | OUTPATIENT
Start: 2023-11-27 | End: 2023-11-30 | Stop reason: HOSPADM

## 2023-11-27 RX ADMIN — IPRATROPIUM BROMIDE AND ALBUTEROL SULFATE 3 ML: 2.5; .5 SOLUTION RESPIRATORY (INHALATION) at 09:28

## 2023-11-27 RX ADMIN — METHYLPREDNISOLONE SODIUM SUCCINATE 40 MG: 40 INJECTION, POWDER, LYOPHILIZED, FOR SOLUTION INTRAMUSCULAR; INTRAVENOUS at 02:55

## 2023-11-27 RX ADMIN — THEOPHYLLINE 400 MG: 400 TABLET, EXTENDED RELEASE ORAL at 09:08

## 2023-11-27 RX ADMIN — BENZONATATE 200 MG: 100 CAPSULE ORAL at 02:22

## 2023-11-27 RX ADMIN — ASPIRIN 81 MG: 81 TABLET, COATED ORAL at 09:08

## 2023-11-27 RX ADMIN — BENZONATATE 200 MG: 100 CAPSULE ORAL at 09:08

## 2023-11-27 RX ADMIN — CALCIUM CARBONATE (ANTACID) CHEW TAB 500 MG 500 MG: 500 CHEW TAB at 02:22

## 2023-11-27 RX ADMIN — HEPARIN SODIUM 7500 UNITS: 5000 INJECTION INTRAVENOUS; SUBCUTANEOUS at 22:18

## 2023-11-27 RX ADMIN — CARVEDILOL 25 MG: 25 TABLET, FILM COATED ORAL at 18:10

## 2023-11-27 RX ADMIN — CEFTRIAXONE SODIUM 2 G: 2 INJECTION, SOLUTION INTRAVENOUS at 09:09

## 2023-11-27 RX ADMIN — MONTELUKAST 10 MG: 10 TABLET, FILM COATED ORAL at 20:10

## 2023-11-27 RX ADMIN — GUAIFENESIN 1200 MG: 600 TABLET ORAL at 00:57

## 2023-11-27 RX ADMIN — Medication 3 MG: at 20:10

## 2023-11-27 RX ADMIN — PANTOPRAZOLE SODIUM 40 MG: 40 TABLET, DELAYED RELEASE ORAL at 06:43

## 2023-11-27 RX ADMIN — HEPARIN SODIUM 7500 UNITS: 5000 INJECTION INTRAVENOUS; SUBCUTANEOUS at 04:31

## 2023-11-27 RX ADMIN — AZITHROMYCIN MONOHYDRATE 500 MG: 500 INJECTION, POWDER, LYOPHILIZED, FOR SOLUTION INTRAVENOUS at 09:55

## 2023-11-27 RX ADMIN — FLUTICASONE FUROATE AND VILANTEROL TRIFENATATE 1 PUFF: 200; 25 POWDER RESPIRATORY (INHALATION) at 06:43

## 2023-11-27 RX ADMIN — METHYLPREDNISOLONE SODIUM SUCCINATE 40 MG: 40 INJECTION, POWDER, LYOPHILIZED, FOR SOLUTION INTRAMUSCULAR; INTRAVENOUS at 18:10

## 2023-11-27 RX ADMIN — TIOTROPIUM BROMIDE INHALATION SPRAY 2 PUFF: 3.12 SPRAY, METERED RESPIRATORY (INHALATION) at 06:43

## 2023-11-27 RX ADMIN — LORATADINE 10 MG: 10 TABLET ORAL at 09:08

## 2023-11-27 RX ADMIN — HEPARIN SODIUM 7500 UNITS: 5000 INJECTION INTRAVENOUS; SUBCUTANEOUS at 13:29

## 2023-11-27 RX ADMIN — METHYLPREDNISOLONE SODIUM SUCCINATE 40 MG: 40 INJECTION, POWDER, LYOPHILIZED, FOR SOLUTION INTRAMUSCULAR; INTRAVENOUS at 09:55

## 2023-11-27 RX ADMIN — CARVEDILOL 25 MG: 25 TABLET, FILM COATED ORAL at 09:08

## 2023-11-27 SDOH — ECONOMIC STABILITY: TRANSPORTATION INSECURITY
IN THE PAST 12 MONTHS, HAS LACK OF TRANSPORTATION KEPT YOU FROM MEETINGS, WORK, OR FROM GETTING THINGS NEEDED FOR DAILY LIVING?: NO

## 2023-11-27 SDOH — ECONOMIC STABILITY: FOOD INSECURITY: WITHIN THE PAST 12 MONTHS, YOU WORRIED THAT YOUR FOOD WOULD RUN OUT BEFORE YOU GOT MONEY TO BUY MORE.: NEVER TRUE

## 2023-11-27 SDOH — ECONOMIC STABILITY: INCOME INSECURITY: IN THE PAST 12 MONTHS, HAS THE ELECTRIC, GAS, OIL, OR WATER COMPANY THREATENED TO SHUT OFF SERVICE IN YOUR HOME?: NO

## 2023-11-27 SDOH — ECONOMIC STABILITY: FOOD INSECURITY: WITHIN THE PAST 12 MONTHS, THE FOOD YOU BOUGHT JUST DIDN'T LAST AND YOU DIDN'T HAVE MONEY TO GET MORE.: NEVER TRUE

## 2023-11-27 SDOH — ECONOMIC STABILITY: INCOME INSECURITY: IN THE LAST 12 MONTHS, WAS THERE A TIME WHEN YOU WERE NOT ABLE TO PAY THE MORTGAGE OR RENT ON TIME?: NO

## 2023-11-27 SDOH — ECONOMIC STABILITY: TRANSPORTATION INSECURITY
IN THE PAST 12 MONTHS, HAS THE LACK OF TRANSPORTATION KEPT YOU FROM MEDICAL APPOINTMENTS OR FROM GETTING MEDICATIONS?: NO

## 2023-11-27 SDOH — ECONOMIC STABILITY: HOUSING INSECURITY
IN THE LAST 12 MONTHS, WAS THERE A TIME WHEN YOU DID NOT HAVE A STEADY PLACE TO SLEEP OR SLEPT IN A SHELTER (INCLUDING NOW)?: NO

## 2023-11-27 ASSESSMENT — COGNITIVE AND FUNCTIONAL STATUS - GENERAL
MOBILITY SCORE: 24
MOBILITY SCORE: 24
DAILY ACTIVITIY SCORE: 24
DAILY ACTIVITIY SCORE: 24

## 2023-11-27 ASSESSMENT — PAIN SCALES - GENERAL: PAINLEVEL_OUTOF10: 0 - NO PAIN

## 2023-11-27 NOTE — PROGRESS NOTES
Zohra Toledo [id number]   Service: Internal Medicine / Hospitalist Date of service: 11/27/23                          Full Code          Subjective  Patient seen and examined, lab data and diagnostics reviewed.      History Of Present Illness  Zohra Toledo is a 58 y.o. female presenting with complaint of cough and shortness of breath at least since October.  She is seen several providers regarding this and completed at least 1 round of doxycycline and tapering course of prednisone without significant improvement.  She was seen by the pulmonology clinic here October 30.  She was seen most recently 11/8 in the ED for chest discomfort and felt suitable for discharge back home.  At this time she is not complaining of any chest discomfort.  At the time of the visit she is seen sitting on the edge of the gurney eating a meal.  States she is feeling significantly better on 2 L nasal cannula.  Uses no home O2.  She is very concerned as she wishes to get off of oxygen so she can return to her job as a .  In the ED she was found to have creatinine of 2.05 and appears her baseline is closer to 0.95 which was seen on 11/8.  She denies using any NSAIDs but is on losartan and diuretic at home.  She does not feel that she has not been consuming adequate fluids.  She does not feel she is experiencing any unusual lower extremity edema and typically sees what she is seeing now.  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  Has not really experienced any fever or chills or purulent sputum.  Otherwise denies interval new symptoms or complaints including chest pain palpitations pleuritic type pain nausea abdominal pain flank pain dysuria     ED course: Placed on 2 L nasal cannula though no hypoxia documented in the chart.  Creatinine 2.05 with sodium 126 WBC 12.8 and hemoglobin 12.2.  CXR reveals a new left basilar infiltrate.  Troponin 29 and lactic acid 1.3 with BNP  23 11/27............No reported: acute symptomatology at the time of evaluation including but not limited to :chest pain, abdominal pain, dysuria , nausea, vomiting ,headache, new focal weakness, diaphoresis,fever, chills, syncope,presyncope or palpitations. She however endorsed still having some dyspnea and hoping that she will be off oxygen in light of her job before discharge.      Review of Systems:   Review of system otherwise negative if not aforementioned above in subjective.    Objective      Physical Exam     Constitutional:       Appearance: Patient appeared in no acute cardiopulmonary distress.     Comments: Patient alert and oriented to person place time and situation.  HEENT:      Head: Normocephalic and atraumatic.Trachea midline      Nose:No observed congestion or rhinorrhea.     Mouth/Throat: Mucous membranes Moist, Trachea appeared  midline.  Eyes:      Extraocular Movements: Extraocular movements intact.      Pupils: Pupils are equal, round, and reactive to light.      Comments: No scleral icterus or conjunctival injection appreciated.   Cardiovascular:      Rate and Rhythm: Normal rate and regular rhythm. No clicks rubs or gallops, normal S1 and S2.No peripheral stigmata of endocarditis appreciated.     Pulmonary:     Mild wheeze appreciated in the midlung right field on auscultation otherwise clear to auscultation today.  Abdominal:      General: Abdomen soft, nontender, active bowel sounds, no involuntary guarding or rebound tenderness appreciated.     Comments: None   Musculoskeletal:       Patient appeared to have full active range of motion for upper and lower extremities, no acute apparent joint deformity appreciated on examination.   No pitting edema or cyanosis appreciated.       Lymphadenopathy:      No appreciable palpable lymphadenopathy  Skin:     General: Skin is warm.      Coloration:  No jaundice     Findings: No abnormal appearing skin rashes or lesions that appeared acute noted  on unclothed area of the skin..   Neurological:      General: No focal sensory or motor deficits appreciated, no meningeal signs or dysmetria noted.      Cranial Nerves: Cranial nerves II to XII appearing grossly intact.     Genitals:  Deferred  Psychiatric:         The patient appears to be displaying normal mood and affect at the time of evaluation.    Labs:     Lab Results   Component Value Date    GLUCOSE 224 (H) 11/27/2023    CALCIUM 8.0 (L) 11/27/2023     (L) 11/27/2023    K 4.5 11/27/2023    CO2 25 11/27/2023     11/27/2023    BUN 33 (H) 11/27/2023    CREATININE 1.48 (H) 11/27/2023      Lab Results   Component Value Date    WBC 12.9 (H) 11/27/2023    HGB 10.9 (L) 11/27/2023    HCT 33.0 (L) 11/27/2023    MCV 86 11/27/2023     11/27/2023      [unfilled]   [unfilled]   No results found for the last 90 days.              X-rays/ Images    Procedure Component Value Units Date/Time   XR chest 1 view [[id number]] Collected: 11/26/23 0956   Order Status: Completed Updated: 11/26/23 0956   Narrative:     Interpreted By:  Pablito Holder,  STUDY:  XR CHEST 1 VIEW;  11/26/2023 9:52 am      INDICATION:  Signs/Symptoms:cough, productive, hypoxia.      COMPARISON:  Portable chest, 8 November 2023      ACCESSION NUMBER(S):  [id number]      ORDERING CLINICIAN:  JERMAIN PUENTES      TECHNIQUE:  Single frontal view of the chest; Portable technique      FINDINGS:      The cardiomediastinal silhouette is unchanged      New left basilar infiltrate      Remainder of lungs clear      No demonstrable pleural effusion or pneumothorax       Impression:     New left basilar infiltrate suspect for pneumonia      MACRO:  None      Signed by: Pablito Holder 11/26/2023 9:55 AM  Dictation workstation:   [id number]             Medical Problems       Problem List       * (Principal) Pneumonia of left lower lobe due to infectious organism    Benign hypertension    Severe asthma with exacerbation    Edema    Chronic  respiratory failure with hypoxia (CMS/HCC)    Laryngopharyngeal reflux (LPR)    Obesity    BOBO (obstructive sleep apnea)    Cellulitis               Above medical problems may be reflective of historical medical problems that may have resolved and may not related to acute clinical condition/medical problems.    Clinical impression/plan:    Principal Problem:    Pneumonia of left lower lobe due to infectious organism        1.  CAP         Left lower lobe pneumonia likely on CXR.  Apparently failed outpatient antibiotic treatment.  Rocephin and azithromycin empirically.     2.  Acute hypoxic respiratory failure           Likely related to acute pneumonia on underlying asthma.  Could not find hypoxia documented in the chart and remains on 2 L.  Wean as able.     3.  Exacerbation asthma              Continue home medications.  Systemic corticosteroids.  Oxygen supplementation as needed.  Appears stable and improving.     4.  Hyponatremia.              Uncertain etiology at this time though possibly related to pneumonia.  Monitor and work-up if persistent     5.  HTN              Appears good control.  Continue home medications with the exception of losartan.  Also holding diuretics in view of JOSE.     6.  JOSE.               Creatinine 2.05 and appears baseline closer to 0.95.   Nephrology consultation.  Continue to monitor.     7.  Hypothyroid              Continue home medications.     8.  DM 2.              Does not appear to be on any medications.  Monitor for now.  May experience exacerbation with systemic corticosteroids however.     9.  History of PE/factor V Leiden deficiency               Appears to have been provoked related to procedure at the time.  Not on any systemic anticoagulation.  At this time we will continue with DVT prophylaxis.     10.  Obstructive sleep apnea                     Patient admits she is not wearing CPAP as she feels this makes her feel more short of breath.  Extensive discussion with  the patient regarding natural history risks and options and to follow-up with pulmonology clinic regarding this as she is not sure they are aware of her noncompliance.    Additional care plan/disposition: 11/27      Will continue current antimicrobial therapy for pneumonia, further pathogen directed therapy in accordance with culture results.    Continue systematic corticosteroids    Close follow-up with pulmonology concerning asthma disposition.    Continue to monitor renal function closely, nephrology consultation and recommendations appreciated.      The patient was informed of differential diagnosis , work up , plan of care and possible sequelae of clinical disposition.Patient in agreement with plan of care. Further recommendations forthcoming in accordance with patient's clinical disposition and response to care.    Discharge planning:Possible discharge in the next 48 hours.    Care time:Less than 55 minutes           Dictation performed with assistance of voice recognition device therefore transcription errors are possible.

## 2023-11-27 NOTE — PROGRESS NOTES
..       [facility] Progress Note           Subjective/   58 y.o. year old female who we are seeing in consultation for JOSE.     Had acute asthma attack this morning  Feeling better now, some slight sob but getting better  Remains on supplemental oxygen  Appetite getting better  Making good urine  No N/V/D, chest pain, worsening edema    Cardiovascular ROS: no chest pain or dyspnea on exertion  Respiratory ROS: positive for - shortness of breath, wheezing  Gastrointestinal ROS: no abdominal pain, change in bowel habits, or black or bloody stools      No change in PFSH    All data labs/interval notes and overnight issues are reviewed  Objective/     Vitals:    11/26/23 1634 11/26/23 1805 11/26/23 2115 11/27/23 0509   BP: 111/71 121/63 135/79 149/82   BP Location:   Right arm Right arm   Patient Position:  Lying Lying Lying   Pulse: 81 90 92 80   Resp: 22 19 18 18   Temp:  36.4 °C (97.5 °F) 36.1 °C (97 °F) 36.1 °C (97 °F)   TempSrc:  Temporal Temporal Temporal   SpO2: 95% 90% 93% 92%   Weight:       Height:            Intake/Output Summary (Last 24 hours) at 11/27/2023 0920  Last data filed at 11/26/2023 2314  Gross per 24 hour   Intake 1053.67 ml   Output --   Net 1053.67 ml        aspirin, 81 mg, oral, Daily  azelastine, 1 spray, Each Nostril, BID  azithromycin, 500 mg, intravenous, q24h  carvedilol, 25 mg, oral, BID with meals  cefTRIAXone, 2 g, intravenous, q24h  fluticasone, 1 spray, Each Nostril, BID  tiotropium, 2 Inhalation, inhalation, Daily   And  fluticasone furoate-vilanteroL, 1 puff, inhalation, Daily  heparin (porcine), 7,500 Units, subcutaneous, q8h CARMITA  loratadine, 10 mg, oral, Daily  [Held by provider] losartan, 100 mg, oral, Daily  melatonin, 3 mg, oral, Daily  methylPREDNISolone sodium succinate (PF), 40 mg, intravenous, q8h  montelukast, 10 mg, oral, Nightly  pantoprazole, 40 mg, oral, Daily before breakfast   Or  pantoprazole, 40 mg, intravenous, Daily before breakfast  pneumococcal  conjugate, 0.5 mL, intramuscular, During hospitalization  polyethylene glycol, 17 g, oral, Daily  theophylline ER, 400 mg, oral, Daily         Physical Exam  Constitutional:       General: She is not in acute distress.     Appearance: Normal appearance.   HENT:      Head: Normocephalic and atraumatic.   Eyes:      Extraocular Movements: Extraocular movements intact.      Pupils: Pupils are equal, round, and reactive to light.   Cardiovascular:      Rate and Rhythm: Normal rate and regular rhythm.   Pulmonary:      Breath sounds: Wheezing present.      Comments: Currently on 5 L O2 via NC  Abdominal:      General: Bowel sounds are normal.      Palpations: Abdomen is soft.   Musculoskeletal:      Right lower leg: Edema (2+) present.      Left lower leg: Edema (2+) present.   Skin:     General: Skin is warm and dry.   Neurological:      Mental Status: She is alert and oriented to person, place, and time.    Results from last 7 days   Lab Units 11/27/23  0444 11/26/23  0802   SODIUM mmol/L 132* 126*   POTASSIUM mmol/L 4.5 4.9   CHLORIDE mmol/L 100 93*   CO2 mmol/L 25 26   BUN mg/dL 33* 29*   CREATININE mg/dL 1.48* 2.05*   EGFR mL/min/1.73m*2 41* 28*   GLUCOSE mg/dL 224* <10*   CALCIUM mg/dL 8.0* 9.2            Assessment/Plan   Assessment   58 y.o. female with   JOSE  Hyponatremia, acute  Acute hypoxemic respiratory failure 2/2 #4  PNA  DMT2 with hypoglycemia           RECOMMENDATIONS:    -Scr improving and responded to IVF, baseline ~ 0.8-1.0, non-oliguric, BP stable  -Hold home arb and diuretic  -JOSE workup ordered: UA micro, urine studies. Not found<<reordered    -Check MARKO to evaluate for acute or chronic renal disease   -No further IVF at this time  -Na level improved, appropriate rate of rise  -Monitor urine output  -Recommend use of lower extremity compression stockings to aid in moving fluid intravascularly, ordered   -Abx management for PNA per primary      We will follow along      Thank you for the consult and  the opportunity to participate in the care of this patient. Please do not hesitate to contact us with any questions or concerns.     ADITI Gomez-CNP  [facility], [facility]  [phone]

## 2023-11-27 NOTE — PROGRESS NOTES
11/27/23 1119   Discharge Planning   Living Arrangements Alone   Support Systems Children;Family members   Assistance Needed independent   Type of Residence Private residence   Home or Post Acute Services None   Patient expects to be discharged to: Home   Does the patient need discharge transport arranged? No     Patient lives at home , independent in her ALDs. She Is currently on O2, wean as tolerated, if unable will need Home O2 evaluation. Will follow for needs.

## 2023-11-27 NOTE — CARE PLAN
The patient's goals for the shift include      The clinical goals for the shift include SPO2 >90%    Problem: Fall/Injury  Goal: Not fall by end of shift  Outcome: Progressing  Goal: Be free from injury by end of the shift  Outcome: Progressing  Goal: Verbalize understanding of personal risk factors for fall in the hospital  Outcome: Progressing  Goal: Verbalize understanding of risk factor reduction measures to prevent injury from fall in the home  Outcome: Progressing  Goal: Use assistive devices by end of the shift  Outcome: Progressing  Goal: Pace activities to prevent fatigue by end of the shift  Outcome: Progressing     Problem: Diabetes  Goal: Achieve decreasing blood glucose levels by end of shift  Outcome: Progressing  Goal: Increase stability of blood glucose readings by end of shift  Outcome: Progressing  Goal: Decrease in ketones present in urine by end of shift  Outcome: Progressing  Goal: Maintain electrolyte levels within acceptable range throughout shift  Outcome: Progressing  Goal: Maintain glucose levels >70mg/dl to <250mg/dl throughout shift  Outcome: Progressing  Goal: No changes in neurological exam by end of shift  Outcome: Progressing  Goal: Learn about and adhere to nutrition recommendations by end of shift  Outcome: Progressing  Goal: Vital signs within normal range for age by end of shift  Outcome: Progressing  Goal: Increase self care and/or family involovement by end of shift  Outcome: Progressing  Goal: Receive DSME education by end of shift  Outcome: Progressing

## 2023-11-27 NOTE — CARE PLAN
Problem: Fall/Injury  Goal: Not fall by end of shift  11/27/2023 0753 by Minnie Sauceda RN  Outcome: Progressing  11/27/2023 0752 by Minnie Sauceda RN  Outcome: Progressing  Goal: Be free from injury by end of the shift  11/27/2023 0753 by Minnie Sauceda RN  Outcome: Progressing  11/27/2023 0752 by Minnie Sauceda RN  Outcome: Progressing  Goal: Verbalize understanding of personal risk factors for fall in the hospital  11/27/2023 0753 by Minnie Sauceda RN  Outcome: Progressing  11/27/2023 0752 by Minnie Sauceda RN  Outcome: Progressing  Goal: Verbalize understanding of risk factor reduction measures to prevent injury from fall in the home  11/27/2023 0753 by Minnie Sauceda RN  Outcome: Progressing  11/27/2023 0752 by Minnie Sauceda RN  Outcome: Progressing  Goal: Use assistive devices by end of the shift  11/27/2023 0753 by Minnie Sauceda RN  Outcome: Progressing  11/27/2023 0752 by Minnie Sauceda RN  Outcome: Progressing  Goal: Pace activities to prevent fatigue by end of the shift  11/27/2023 0753 by Minnie Sauceda RN  Outcome: Progressing  11/27/2023 0752 by Minnie Sauceda RN  Outcome: Progressing     Problem: Diabetes  Goal: Achieve decreasing blood glucose levels by end of shift  11/27/2023 0753 by Minnie Sauceda RN  Outcome: Progressing  11/27/2023 0752 by Minnie Sauceda RN  Outcome: Progressing  Goal: Increase stability of blood glucose readings by end of shift  11/27/2023 0753 by Minnie Sauceda RN  Outcome: Progressing  11/27/2023 0752 by Minnie Sauceda RN  Outcome: Progressing  Goal: Decrease in ketones present in urine by end of shift  11/27/2023 0753 by Minnie Sauceda RN  Outcome: Progressing  11/27/2023 0752 by Minnie Sauceda RN  Outcome: Progressing  Goal: Maintain electrolyte levels within acceptable range throughout shift  11/27/2023 0753 by Minnie Sauceda RN  Outcome: Progressing  11/27/2023 0752 by Minnie Sauceda RN  Outcome: Progressing  Goal: Maintain glucose levels >70mg/dl to <250mg/dl  throughout shift  11/27/2023 0753 by Minnie Sauceda RN  Outcome: Progressing  11/27/2023 0752 by Minnie Sauceda RN  Outcome: Progressing  Goal: No changes in neurological exam by end of shift  11/27/2023 0753 by Minnie Sauceda RN  Outcome: Progressing  11/27/2023 0752 by Minnie Sauceda RN  Outcome: Progressing  Goal: Learn about and adhere to nutrition recommendations by end of shift  11/27/2023 0753 by iMnnie Sauceda RN  Outcome: Progressing  11/27/2023 0752 by Minnie Sauceda RN  Outcome: Progressing  Goal: Vital signs within normal range for age by end of shift  11/27/2023 0753 by Minnie Sauceda RN  Outcome: Progressing  11/27/2023 0752 by Minnie Sauceda RN  Outcome: Progressing  Goal: Increase self care and/or family involovement by end of shift  11/27/2023 0753 by Minnie Sauceda RN  Outcome: Progressing  11/27/2023 0752 by Minnie Sauceda RN  Outcome: Progressing  Goal: Receive DSME education by end of shift  11/27/2023 0753 by Minnie Sauceda RN  Outcome: Progressing  11/27/2023 0752 by Minnie Sauceda RN  Outcome: Progressing   The patient's goals for the shift include      The clinical goals for the shift include SPO2 >90%

## 2023-11-27 NOTE — H&P
HISTORY & PHYSICAL    History Of Present Illness  Zohra Toledo is a 58 y.o. female presenting with complaint of cough and shortness of breath at least since October.  She is seen several providers regarding this and completed at least 1 round of doxycycline and tapering course of prednisone without significant improvement.  She was seen by the pulmonology clinic here October 30.  She was seen most recently 11/8 in the ED for chest discomfort and felt suitable for discharge back home.  At this time she is not complaining of any chest discomfort.  At the time of the visit she is seen sitting on the edge of the gurney eating a meal.  States she is feeling significantly better on 2 L nasal cannula.  Uses no home O2.  She is very concerned as she wishes to get off of oxygen so she can return to her job as a .  In the ED she was found to have creatinine of 2.05 and appears her baseline is closer to 0.95 which was seen on 11/8.  She denies using any NSAIDs but is on losartan and diuretic at home.  She does not feel that she has not been consuming adequate fluids.  She does not feel she is experiencing any unusual lower extremity edema and typically sees what she is seeing now.  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  Has not really experienced any fever or chills or purulent sputum.  Otherwise denies interval new symptoms or complaints including chest pain palpitations pleuritic type pain nausea abdominal pain flank pain dysuria    ED course: Placed on 2 L nasal cannula though no hypoxia documented in the chart.  Creatinine 2.05 with sodium 126 WBC 12.8 and hemoglobin 12.2.  CXR reveals a new left basilar infiltrate.  Troponin 29 and lactic acid 1.3 with BNP 23     Past Medical History  She has a past medical history of Asthma, Diabetes 1.5, managed as type 2 (CMS/[facility]), and Factor 5 Leiden mutation, heterozygous (CMS/[facility]).    Surgical History  She has no past surgical  history on file.     Social History  She reports that she quit smoking about 23 years ago. Her smoking use included cigarettes. She has a 0.50 pack-year smoking history. She has never used smokeless tobacco. She reports that she does not currently use alcohol. She reports that she does not use drugs.    Family History  No family history on file.     Allergies  Adhesive tape-silicones    Code Status  Full Code     Review of Systems   Constitutional:  Positive for appetite change. Negative for unexpected weight change.        Complains of increasing shortness of breath over the past several weeks as well as increasing cough   HENT:  Negative for rhinorrhea, sinus pain and sore throat.    Eyes:  Negative for photophobia and visual disturbance.   Respiratory:  Positive for cough, chest tightness, shortness of breath and wheezing.    Cardiovascular: Negative.  Negative for chest pain and palpitations.   Gastrointestinal:  Negative for abdominal pain, blood in stool, diarrhea, nausea and vomiting.   Endocrine: Negative for polydipsia and polyuria.   Genitourinary:  Negative for difficulty urinating, dysuria, flank pain, frequency and hematuria.   Musculoskeletal:  Negative for arthralgias, back pain, joint swelling, myalgias and neck stiffness.   Skin:  Negative for rash.   Neurological:  Negative for dizziness, weakness, light-headedness, numbness and headaches.   Hematological:  Does not bruise/bleed easily.   Psychiatric/Behavioral:  Negative for agitation, confusion and hallucinations. The patient is not nervous/anxious.        Last Recorded Vitals  /63 (Patient Position: Lying)   Pulse 90   Temp 36.4 °C (97.5 °F) (Temporal)   Resp 19   Wt 127 kg (280 lb)   SpO2 90%      Physical Exam  Constitutional:       General: She is not in acute distress.     Appearance: She is not ill-appearing, toxic-appearing or diaphoretic.      Comments: Morbidly obese  female awake alert and interactive appropriately  who appears NAD at the time of visit   HENT:      Head: Normocephalic and atraumatic.      Nose: No congestion or rhinorrhea.      Mouth/Throat:      Mouth: Mucous membranes are moist.      Pharynx: Oropharynx is clear.   Eyes:      Extraocular Movements: Extraocular movements intact.      Conjunctiva/sclera: Conjunctivae normal.      Pupils: Pupils are equal, round, and reactive to light.   Cardiovascular:      Rate and Rhythm: Normal rate and regular rhythm.      Heart sounds: No murmur heard.  Pulmonary:      Effort: Pulmonary effort is normal.      Comments: Bilateral posterior scattered Rales and scattered expiratory wheezing with fair to good air exchange bilaterally  Abdominal:      General: Bowel sounds are normal. There is no distension.      Palpations: Abdomen is soft.      Tenderness: There is no abdominal tenderness. There is no guarding.   Musculoskeletal:      Cervical back: Normal range of motion and neck supple.      Comments: Bilateral 2+ pretibial pitting edema   Skin:     General: Skin is warm and dry.      Capillary Refill: Capillary refill takes less than 2 seconds.      Coloration: Skin is not jaundiced.      Findings: No bruising or rash.   Neurological:      General: No focal deficit present.      Mental Status: She is oriented to person, place, and time.   Psychiatric:         Mood and Affect: Mood normal.         Behavior: Behavior normal.         Judgment: Judgment normal.      Comments: Pleasant and cooperative to exam          Relevant Results  Results for orders placed or performed during the hospital encounter of 11/26/23 (from the past 24 hour(s))   CBC and Auto Differential   Result Value Ref Range    WBC 12.8 (H) 4.4 - 11.3 x10*3/uL    nRBC 0.0 0.0 - 0.0 /100 WBCs    RBC 4.35 4.00 - 5.20 x10*6/uL    Hemoglobin 12.2 12.0 - 16.0 g/dL    Hematocrit 37.9 36.0 - 46.0 %    MCV 87 80 - 100 fL    MCH 28.0 26.0 - 34.0 pg    MCHC 32.2 32.0 - 36.0 g/dL    RDW 16.0 (H) 11.5 - 14.5 %     Platelets 219 150 - 450 x10*3/uL    Neutrophils % 73.5 40.0 - 80.0 %    Immature Granulocytes %, Automated 0.5 0.0 - 0.9 %    Lymphocytes % 16.1 13.0 - 44.0 %    Monocytes % 9.3 2.0 - 10.0 %    Eosinophils % 0.3 0.0 - 6.0 %    Basophils % 0.3 0.0 - 2.0 %    Neutrophils Absolute 9.41 (H) 1.20 - 7.70 x10*3/uL    Immature Granulocytes Absolute, Automated 0.07 0.00 - 0.70 x10*3/uL    Lymphocytes Absolute 2.07 1.20 - 4.80 x10*3/uL    Monocytes Absolute 1.19 (H) 0.10 - 1.00 x10*3/uL    Eosinophils Absolute 0.04 0.00 - 0.70 x10*3/uL    Basophils Absolute 0.04 0.00 - 0.10 x10*3/uL   Magnesium   Result Value Ref Range    Magnesium 2.25 1.60 - 2.40 mg/dL   Comprehensive metabolic panel   Result Value Ref Range    Glucose <10 (LL) 74 - 99 mg/dL    Sodium 126 (L) 136 - 145 mmol/L    Potassium 4.9 3.5 - 5.3 mmol/L    Chloride 93 (L) 98 - 107 mmol/L    Bicarbonate 26 21 - 32 mmol/L    Anion Gap 12 10 - 20 mmol/L    Urea Nitrogen 29 (H) 6 - 23 mg/dL    Creatinine 2.05 (H) 0.50 - 1.05 mg/dL    eGFR 28 (L) >60 mL/min/1.73m*2    Calcium 9.2 8.6 - 10.3 mg/dL    Albumin 3.7 3.4 - 5.0 g/dL    Alkaline Phosphatase 50 33 - 110 U/L    Total Protein 7.0 6.4 - 8.2 g/dL    AST <3 (L) 9 - 39 U/L    Bilirubin, Total 1.2 0.0 - 1.2 mg/dL    ALT 23 7 - 45 U/L   Troponin I, High Sensitivity   Result Value Ref Range    Troponin I, High Sensitivity 29 (H) 0 - 13 ng/L   B-Type Natriuretic Peptide   Result Value Ref Range    BNP 23 0 - 99 pg/mL   Influenza A, and B PCR   Result Value Ref Range    Flu A Result Not Detected Not Detected    Flu B Result Not Detected Not Detected   SARS-CoV-2 RT PCR   Result Value Ref Range    Coronavirus 2019, PCR Not Detected Not Detected   Blood Gas Venous Full Panel   Result Value Ref Range    POCT pH, Venous 7.39 7.33 - 7.43 pH    POCT pCO2, Venous 53 (H) 41 - 51 mm Hg    POCT pO2, Venous 32 (L) 35 - 45 mm Hg    POCT SO2, Venous 38 (L) 45 - 75 %    POCT Oxy Hemoglobin, Venous 37.5 (L) 45.0 - 75.0 %    POCT Hematocrit  Calculated, Venous 35.0 (L) 36.0 - 46.0 %    POCT Sodium, Venous 132 (L) 136 - 145 mmol/L    POCT Potassium, Venous 4.4 3.5 - 5.3 mmol/L    POCT Chloride, Venous 98 98 - 107 mmol/L    POCT Ionized Calicum, Venous 1.16 1.10 - 1.33 mmol/L    POCT Glucose, Venous 129 (H) 74 - 99 mg/dL    POCT Lactate, Venous 1.3 0.4 - 2.0 mmol/L    POCT Base Excess, Venous 5.9 (H) -2.0 - 3.0 mmol/L    POCT HCO3 Calculated, Venous 32.1 (H) 22.0 - 26.0 mmol/L    POCT Hemoglobin, Venous 11.5 (L) 12.0 - 16.0 g/dL    POCT Anion Gap, Venous 6.0 (L) 10.0 - 25.0 mmol/L    Patient Temperature 37.0 degrees Celsius    FiO2 28 %   POCT GLUCOSE   Result Value Ref Range    POCT Glucose 124 (H) 74 - 99 mg/dL   POCT GLUCOSE   Result Value Ref Range    POCT Glucose 252 (H) 74 - 99 mg/dL      IMAGING:  XR chest 1 view   Final Result   New left basilar infiltrate suspect for pneumonia        MACRO:   None        Signed by: Pablito Holder 11/26/2023 9:55 AM   Dictation workstation:   [id number]         Scheduled medications:  aspirin, 81 mg, oral, Daily  azelastine, 1 spray, Each Nostril, BID  azithromycin, 500 mg, intravenous, q24h  carvedilol, 25 mg, oral, BID with meals  cefTRIAXone, 2 g, intravenous, q24h  fluticasone, 1 spray, Each Nostril, BID  tiotropium, 2 Inhalation, inhalation, Daily   And  fluticasone furoate-vilanteroL, 1 puff, inhalation, Daily  heparin (porcine), 7,500 Units, subcutaneous, q8h CAMRITA  loratadine, 10 mg, oral, Daily  [Held by provider] losartan, 100 mg, oral, Daily  melatonin, 3 mg, oral, Daily  methylPREDNISolone sodium succinate (PF), 40 mg, intravenous, q8h  montelukast, 10 mg, oral, Nightly  [START ON 11/27/2023] pantoprazole, 40 mg, oral, Daily before breakfast   Or  [START ON 11/27/2023] pantoprazole, 40 mg, intravenous, Daily before breakfast  pneumococcal conjugate, 0.5 mL, intramuscular, During hospitalization  polyethylene glycol, 17 g, oral, Daily  theophylline ER, 400 mg, oral, Daily      Continuous  medications:  sodium chloride 0.9%, 100 mL/hr, Last Rate: 100 mL/hr (11/26/23 8915)      PRN medications:  PRN medications: acetaminophen **OR** acetaminophen **OR** acetaminophen, acetaminophen **OR** acetaminophen **OR** acetaminophen, albuterol, benzonatate, guaiFENesin, ipratropium-albuteroL, ondansetron **OR** ondansetron   .     Problem List Items Addressed This Visit          Pulmonary and Pneumonias    * (Principal) Pneumonia of left lower lobe due to infectious organism - Primary      Assessment/Plan   Principal Problem:    Pneumonia of left lower lobe due to infectious organism      1.  CAP         Left lower lobe pneumonia likely on CXR.  Apparently failed outpatient antibiotic treatment.  Rocephin and azithromycin empirically.    2.  Acute hypoxic respiratory failure           Likely related to acute pneumonia on underlying asthma.  Could not find hypoxia documented in the chart and remains on 2 L.  Wean as able.    3.  Exacerbation asthma              Continue home medications.  Systemic corticosteroids.  Oxygen supplementation as needed.  Appears stable and improving.    4.  Hyponatremia.              Uncertain etiology at this time though possibly related to pneumonia.  Monitor and work-up if persistent    5.  HTN              Appears good control.  Continue home medications with the exception of losartan.  Also holding diuretics in view of JOSE.    6.  JOSE.               Creatinine 2.05 and appears baseline closer to 0.95.   Nephrology consultation.  Continue to monitor.    7.  Hypothyroid              Continue home medications.    8.  DM 2.              Does not appear to be on any medications.  Monitor for now.  May experience exacerbation with systemic corticosteroids however.    9.  History of PE/factor V Leiden deficiency               Appears to have been provoked related to procedure at the time.  Not on any systemic anticoagulation.  At this time we will continue with DVT prophylaxis.    10.   Obstructive sleep apnea                     Patient admits she is not wearing CPAP as she feels this makes her feel more short of breath.  Extensive discussion with the patient regarding natural history risks and options and to follow-up with pulmonology clinic regarding this as she is not sure they are aware of her noncompliance.       Patel Chavarria MD  11/26/2023  8:14 PM

## 2023-11-27 NOTE — PROGRESS NOTES
Social work consult placed for positive medical risk screen and social determinants for financial strain. SW reviewed pt's chart and communicated with [facility].  Pt declined any community resource needs. No SW needs foreseen at this time. SW signing off; available upon request.

## 2023-11-28 LAB
ALBUMIN SERPL BCP-MCNC: 3.3 G/DL (ref 3.4–5)
ANION GAP SERPL CALC-SCNC: 12 MMOL/L (ref 10–20)
BUN SERPL-MCNC: 26 MG/DL (ref 6–23)
CALCIUM SERPL-MCNC: 7.9 MG/DL (ref 8.6–10.3)
CHLORIDE SERPL-SCNC: 101 MMOL/L (ref 98–107)
CO2 SERPL-SCNC: 24 MMOL/L (ref 21–32)
CREAT SERPL-MCNC: 1.09 MG/DL (ref 0.5–1.05)
GFR SERPL CREATININE-BSD FRML MDRD: 59 ML/MIN/1.73M*2
GLUCOSE BLD MANUAL STRIP-MCNC: 205 MG/DL (ref 74–99)
GLUCOSE BLD MANUAL STRIP-MCNC: 247 MG/DL (ref 74–99)
GLUCOSE BLD MANUAL STRIP-MCNC: 277 MG/DL (ref 74–99)
GLUCOSE BLD MANUAL STRIP-MCNC: 289 MG/DL (ref 74–99)
GLUCOSE SERPL-MCNC: 282 MG/DL (ref 74–99)
POTASSIUM SERPL-SCNC: 4.6 MMOL/L (ref 3.5–5.3)
SODIUM SERPL-SCNC: 132 MMOL/L (ref 136–145)

## 2023-11-28 PROCEDURE — 2500000004 HC RX 250 GENERAL PHARMACY W/ HCPCS (ALT 636 FOR OP/ED): Performed by: FAMILY MEDICINE

## 2023-11-28 PROCEDURE — 96372 THER/PROPH/DIAG INJ SC/IM: CPT | Performed by: FAMILY MEDICINE

## 2023-11-28 PROCEDURE — 82040 ASSAY OF SERUM ALBUMIN: CPT | Performed by: NURSE PRACTITIONER

## 2023-11-28 PROCEDURE — 2500000001 HC RX 250 WO HCPCS SELF ADMINISTERED DRUGS (ALT 637 FOR MEDICARE OP): Performed by: FAMILY MEDICINE

## 2023-11-28 PROCEDURE — 82947 ASSAY GLUCOSE BLOOD QUANT: CPT

## 2023-11-28 PROCEDURE — 2500000002 HC RX 250 W HCPCS SELF ADMINISTERED DRUGS (ALT 637 FOR MEDICARE OP, ALT 636 FOR OP/ED): Performed by: HOSPITALIST

## 2023-11-28 PROCEDURE — 2500000002 HC RX 250 W HCPCS SELF ADMINISTERED DRUGS (ALT 637 FOR MEDICARE OP, ALT 636 FOR OP/ED): Performed by: FAMILY MEDICINE

## 2023-11-28 PROCEDURE — 94640 AIRWAY INHALATION TREATMENT: CPT

## 2023-11-28 PROCEDURE — 99232 SBSQ HOSP IP/OBS MODERATE 35: CPT | Performed by: HOSPITALIST

## 2023-11-28 PROCEDURE — 82374 ASSAY BLOOD CARBON DIOXIDE: CPT | Performed by: HOSPITALIST

## 2023-11-28 PROCEDURE — 1100000001 HC PRIVATE ROOM DAILY

## 2023-11-28 PROCEDURE — 36415 COLL VENOUS BLD VENIPUNCTURE: CPT | Performed by: HOSPITALIST

## 2023-11-28 RX ORDER — DEXTROSE MONOHYDRATE 100 MG/ML
0.3 INJECTION, SOLUTION INTRAVENOUS ONCE AS NEEDED
Status: DISCONTINUED | OUTPATIENT
Start: 2023-11-28 | End: 2023-11-30 | Stop reason: HOSPADM

## 2023-11-28 RX ORDER — INSULIN GLARGINE 100 [IU]/ML
5 INJECTION, SOLUTION SUBCUTANEOUS EVERY 24 HOURS
Status: COMPLETED | OUTPATIENT
Start: 2023-11-28 | End: 2023-11-28

## 2023-11-28 RX ORDER — DEXTROSE 50 % IN WATER (D50W) INTRAVENOUS SYRINGE
25
Status: DISCONTINUED | OUTPATIENT
Start: 2023-11-28 | End: 2023-11-30 | Stop reason: HOSPADM

## 2023-11-28 RX ADMIN — FLUTICASONE FUROATE AND VILANTEROL TRIFENATATE 1 PUFF: 200; 25 POWDER RESPIRATORY (INHALATION) at 09:46

## 2023-11-28 RX ADMIN — BENZONATATE 200 MG: 100 CAPSULE ORAL at 05:53

## 2023-11-28 RX ADMIN — BENZONATATE 200 MG: 100 CAPSULE ORAL at 14:57

## 2023-11-28 RX ADMIN — CEFTRIAXONE SODIUM 2 G: 2 INJECTION, SOLUTION INTRAVENOUS at 09:43

## 2023-11-28 RX ADMIN — HEPARIN SODIUM 7500 UNITS: 5000 INJECTION INTRAVENOUS; SUBCUTANEOUS at 21:27

## 2023-11-28 RX ADMIN — ASPIRIN 81 MG: 81 TABLET, COATED ORAL at 09:48

## 2023-11-28 RX ADMIN — METHYLPREDNISOLONE SODIUM SUCCINATE 40 MG: 40 INJECTION, POWDER, LYOPHILIZED, FOR SOLUTION INTRAMUSCULAR; INTRAVENOUS at 18:02

## 2023-11-28 RX ADMIN — PANTOPRAZOLE SODIUM 40 MG: 40 TABLET, DELAYED RELEASE ORAL at 05:53

## 2023-11-28 RX ADMIN — HEPARIN SODIUM 7500 UNITS: 5000 INJECTION INTRAVENOUS; SUBCUTANEOUS at 14:57

## 2023-11-28 RX ADMIN — CARVEDILOL 25 MG: 25 TABLET, FILM COATED ORAL at 09:48

## 2023-11-28 RX ADMIN — IPRATROPIUM BROMIDE AND ALBUTEROL SULFATE 3 ML: 2.5; .5 SOLUTION RESPIRATORY (INHALATION) at 06:12

## 2023-11-28 RX ADMIN — HEPARIN SODIUM 7500 UNITS: 5000 INJECTION INTRAVENOUS; SUBCUTANEOUS at 05:53

## 2023-11-28 RX ADMIN — TIOTROPIUM BROMIDE INHALATION SPRAY 2 PUFF: 3.12 SPRAY, METERED RESPIRATORY (INHALATION) at 09:47

## 2023-11-28 RX ADMIN — INSULIN GLARGINE 5 UNITS: 100 INJECTION, SOLUTION SUBCUTANEOUS at 22:16

## 2023-11-28 RX ADMIN — AZITHROMYCIN MONOHYDRATE 500 MG: 500 INJECTION, POWDER, LYOPHILIZED, FOR SOLUTION INTRAVENOUS at 10:37

## 2023-11-28 RX ADMIN — LORATADINE 10 MG: 10 TABLET ORAL at 09:48

## 2023-11-28 RX ADMIN — Medication 3 MG: at 21:28

## 2023-11-28 RX ADMIN — METHYLPREDNISOLONE SODIUM SUCCINATE 40 MG: 40 INJECTION, POWDER, LYOPHILIZED, FOR SOLUTION INTRAMUSCULAR; INTRAVENOUS at 10:37

## 2023-11-28 RX ADMIN — THEOPHYLLINE 400 MG: 400 TABLET, EXTENDED RELEASE ORAL at 09:48

## 2023-11-28 RX ADMIN — MONTELUKAST 10 MG: 10 TABLET, FILM COATED ORAL at 21:28

## 2023-11-28 RX ADMIN — METHYLPREDNISOLONE SODIUM SUCCINATE 40 MG: 40 INJECTION, POWDER, LYOPHILIZED, FOR SOLUTION INTRAMUSCULAR; INTRAVENOUS at 02:55

## 2023-11-28 RX ADMIN — CARVEDILOL 25 MG: 25 TABLET, FILM COATED ORAL at 18:02

## 2023-11-28 ASSESSMENT — COGNITIVE AND FUNCTIONAL STATUS - GENERAL
DAILY ACTIVITIY SCORE: 24
MOBILITY SCORE: 24
DAILY ACTIVITIY SCORE: 24
MOBILITY SCORE: 24

## 2023-11-28 ASSESSMENT — PAIN INTENSITY VAS
VAS_PAIN_BASICVITALS_IP: 0
VAS_PAIN_BASICVITALS_IP: 0

## 2023-11-28 ASSESSMENT — PAIN SCALES - GENERAL
PAINLEVEL_OUTOF10: 0 - NO PAIN
PAINLEVEL_OUTOF10: 0 - NO PAIN

## 2023-11-28 NOTE — PROGRESS NOTES
..       [facility] Progress Note           Subjective/   58 y.o. year old female who we are seeing in consultation for JOSE.     NAEON  Feeling better   Breathing is better also  Remains on supplemental oxygen  Appetite getting better  Making good urine  No N/V/D, chest pain, worsening edema    Cardiovascular ROS: no chest pain or dyspnea on exertion  Respiratory ROS: positive for - shortness of breath, wheezing  Gastrointestinal ROS: no abdominal pain, change in bowel habits, or black or bloody stools      No change in PFSH    All data labs/interval notes and overnight issues are reviewed  Objective/     Vitals:    11/27/23 1540 11/27/23 2112 11/28/23 0504 11/28/23 0612   BP: 149/80 133/78 146/82    BP Location:  Left arm Left arm    Patient Position:   Lying    Pulse: 70 65 67    Resp: 18 18 18    Temp: 35.9 °C (96.6 °F) 35.8 °C (96.5 °F) 36.2 °C (97.1 °F)    TempSrc: Temporal Temporal Temporal    SpO2: 92% 90% 93% 94%   Weight:       Height:            Intake/Output Summary (Last 24 hours) at 11/28/2023 1436  Last data filed at 11/28/2023 1126  Gross per 24 hour   Intake 840 ml   Output --   Net 840 ml          aspirin, 81 mg, oral, Daily  azelastine, 1 spray, Each Nostril, BID  azithromycin, 500 mg, intravenous, q24h  carvedilol, 25 mg, oral, BID with meals  cefTRIAXone, 2 g, intravenous, q24h  fluticasone, 1 spray, Each Nostril, BID  tiotropium, 2 Inhalation, inhalation, Daily   And  fluticasone furoate-vilanteroL, 1 puff, inhalation, Daily  heparin (porcine), 7,500 Units, subcutaneous, q8h CARMITA  loratadine, 10 mg, oral, Daily  [Held by provider] losartan, 100 mg, oral, Daily  melatonin, 3 mg, oral, Daily  methylPREDNISolone sodium succinate (PF), 40 mg, intravenous, q8h  montelukast, 10 mg, oral, Nightly  pantoprazole, 40 mg, oral, Daily before breakfast   Or  pantoprazole, 40 mg, intravenous, Daily before breakfast  pneumococcal conjugate, 0.5 mL, intramuscular, During hospitalization  polyethylene  glycol, 17 g, oral, Daily  theophylline ER, 400 mg, oral, Daily         Physical Exam  Constitutional:       General: She is not in acute distress.     Appearance: Normal appearance.   HENT:      Head: Normocephalic and atraumatic.   Eyes:      Sclera clear  Cardiovascular:      Rate and Rhythm: Normal rate and regular rhythm.   Pulmonary:      Breath sounds: Diminished throughout. Expiratory wheezes     Comments: Currently on 2 L O2 via NC  Abdominal:      General: Bowel sounds are normal.      Palpations: Abdomen is soft.   Musculoskeletal:      Right lower leg: Edema (1+) present.      Left lower leg: Edema (1+) present.   Skin:     General: Skin is warm and dry.   Neurological:      Mental Status: She is alert and oriented to person, place, and time.    Results from last 7 days   Lab Units 11/28/23  0359 11/27/23  0444 11/26/23  0802   SODIUM mmol/L 132* 132* 126*   POTASSIUM mmol/L 4.6 4.5 4.9   CHLORIDE mmol/L 101 100 93*   CO2 mmol/L 24 25 26   BUN mg/dL 26* 33* 29*   CREATININE mg/dL 1.09* 1.48* 2.05*   EGFR mL/min/1.73m*2 59* 41* 28*   GLUCOSE mg/dL 282* 224* <10*   CALCIUM mg/dL 7.9* 8.0* 9.2              Assessment/Plan   Assessment   58 y.o. female with   JOSE  Hyponatremia, acute  Acute hypoxemic respiratory failure 2/2 #4  PNA  DMT2 with hypoglycemia           RECOMMENDATIONS:    -Scr at baseline. JOSE resolved. baseline ~ 0.8-1.0, non-oliguric, BP stable  -Hold home arb and diuretic for now, likely can resume tomorrow  -UA +ve glucosuria, otherwise bland  -Na level stable  -Urine output acceptable  -Continue use of BLE compression stockings to aid in moving fluid intravascularly  -Abx management for PNA per primary  -No further changes  -Ok for discharge from renal standpoint and follow up in outpatient with Dr. Connor in 1-2 weeks, BMP prior is ordered      We will follow along      Thank you for the consult and the opportunity to participate in the care of this patient. Please do not hesitate to  contact us with any questions or concerns.     ADITI Gomez-CNP  [facility], [facility]  [phone]

## 2023-11-28 NOTE — CARE PLAN
The patient's goals for the shift include      The clinical goals for the shift include decreased shortness of breath through shift      Problem: Fall/Injury  Goal: Not fall by end of shift  Outcome: Progressing  Goal: Be free from injury by end of the shift  Outcome: Progressing  Goal: Verbalize understanding of personal risk factors for fall in the hospital  Outcome: Progressing  Goal: Verbalize understanding of risk factor reduction measures to prevent injury from fall in the home  Outcome: Progressing  Goal: Use assistive devices by end of the shift  Outcome: Progressing  Goal: Pace activities to prevent fatigue by end of the shift  Outcome: Progressing     Problem: Diabetes  Goal: Achieve decreasing blood glucose levels by end of shift  Outcome: Progressing  Goal: Increase stability of blood glucose readings by end of shift  Outcome: Progressing  Goal: Decrease in ketones present in urine by end of shift  Outcome: Progressing  Goal: Maintain electrolyte levels within acceptable range throughout shift  Outcome: Progressing  Goal: Maintain glucose levels >70mg/dl to <250mg/dl throughout shift  Outcome: Progressing  Goal: No changes in neurological exam by end of shift  Outcome: Progressing  Goal: Learn about and adhere to nutrition recommendations by end of shift  Outcome: Progressing  Goal: Vital signs within normal range for age by end of shift  Outcome: Progressing  Goal: Increase self care and/or family involovement by end of shift  Outcome: Progressing  Goal: Receive DSME education by end of shift  Outcome: Progressing

## 2023-11-28 NOTE — CARE PLAN
Problem: Fall/Injury  Goal: Not fall by end of shift  Outcome: Progressing  Goal: Be free from injury by end of the shift  Outcome: Progressing  Goal: Verbalize understanding of personal risk factors for fall in the hospital  Outcome: Progressing  Goal: Verbalize understanding of risk factor reduction measures to prevent injury from fall in the home  Outcome: Progressing  Goal: Use assistive devices by end of the shift  Outcome: Progressing  Goal: Pace activities to prevent fatigue by end of the shift  Outcome: Progressing     Problem: Diabetes  Goal: Achieve decreasing blood glucose levels by end of shift  Outcome: Progressing  Goal: Increase stability of blood glucose readings by end of shift  Outcome: Progressing  Goal: Decrease in ketones present in urine by end of shift  Outcome: Progressing  Goal: Maintain electrolyte levels within acceptable range throughout shift  Outcome: Progressing  Goal: Maintain glucose levels >70mg/dl to <250mg/dl throughout shift  Outcome: Progressing  Goal: No changes in neurological exam by end of shift  Outcome: Progressing  Goal: Learn about and adhere to nutrition recommendations by end of shift  Outcome: Progressing  Goal: Vital signs within normal range for age by end of shift  Outcome: Progressing  Goal: Increase self care and/or family involovement by end of shift  Outcome: Progressing  Goal: Receive DSME education by end of shift  Outcome: Progressing   The patient's goals for the shift include  decreased shortness of breath    The clinical goals for the shift include SPO2 >90%

## 2023-11-29 LAB
25(OH)D3 SERPL-MCNC: 34 NG/ML (ref 30–100)
ANION GAP SERPL CALC-SCNC: 12 MMOL/L (ref 10–20)
BUN SERPL-MCNC: 25 MG/DL (ref 6–23)
CALCIUM SERPL-MCNC: 8 MG/DL (ref 8.6–10.3)
CHLORIDE SERPL-SCNC: 102 MMOL/L (ref 98–107)
CO2 SERPL-SCNC: 25 MMOL/L (ref 21–32)
CREAT SERPL-MCNC: 0.97 MG/DL (ref 0.5–1.05)
ERYTHROCYTE [DISTWIDTH] IN BLOOD BY AUTOMATED COUNT: 15.1 % (ref 11.5–14.5)
GFR SERPL CREATININE-BSD FRML MDRD: 68 ML/MIN/1.73M*2
GLUCOSE BLD MANUAL STRIP-MCNC: 250 MG/DL (ref 74–99)
GLUCOSE BLD MANUAL STRIP-MCNC: 305 MG/DL (ref 74–99)
GLUCOSE BLD MANUAL STRIP-MCNC: 310 MG/DL (ref 74–99)
GLUCOSE BLD MANUAL STRIP-MCNC: 333 MG/DL (ref 74–99)
GLUCOSE SERPL-MCNC: 243 MG/DL (ref 74–99)
HCT VFR BLD AUTO: 35.1 % (ref 36–46)
HGB BLD-MCNC: 11.3 G/DL (ref 12–16)
MCH RBC QN AUTO: 27.8 PG (ref 26–34)
MCHC RBC AUTO-ENTMCNC: 32.2 G/DL (ref 32–36)
MCV RBC AUTO: 87 FL (ref 80–100)
NRBC BLD-RTO: 0 /100 WBCS (ref 0–0)
PLATELET # BLD AUTO: 254 X10*3/UL (ref 150–450)
POTASSIUM SERPL-SCNC: 5.1 MMOL/L (ref 3.5–5.3)
RBC # BLD AUTO: 4.06 X10*6/UL (ref 4–5.2)
SODIUM SERPL-SCNC: 134 MMOL/L (ref 136–145)
WBC # BLD AUTO: 16.1 X10*3/UL (ref 4.4–11.3)

## 2023-11-29 PROCEDURE — 82947 ASSAY GLUCOSE BLOOD QUANT: CPT

## 2023-11-29 PROCEDURE — 85027 COMPLETE CBC AUTOMATED: CPT | Performed by: HOSPITALIST

## 2023-11-29 PROCEDURE — 2500000002 HC RX 250 W HCPCS SELF ADMINISTERED DRUGS (ALT 637 FOR MEDICARE OP, ALT 636 FOR OP/ED): Performed by: HOSPITALIST

## 2023-11-29 PROCEDURE — 2500000004 HC RX 250 GENERAL PHARMACY W/ HCPCS (ALT 636 FOR OP/ED): Performed by: FAMILY MEDICINE

## 2023-11-29 PROCEDURE — 82306 VITAMIN D 25 HYDROXY: CPT | Performed by: NURSE PRACTITIONER

## 2023-11-29 PROCEDURE — 2500000001 HC RX 250 WO HCPCS SELF ADMINISTERED DRUGS (ALT 637 FOR MEDICARE OP): Performed by: HOSPITALIST

## 2023-11-29 PROCEDURE — 1100000001 HC PRIVATE ROOM DAILY

## 2023-11-29 PROCEDURE — 96372 THER/PROPH/DIAG INJ SC/IM: CPT | Performed by: FAMILY MEDICINE

## 2023-11-29 PROCEDURE — 2500000001 HC RX 250 WO HCPCS SELF ADMINISTERED DRUGS (ALT 637 FOR MEDICARE OP): Performed by: FAMILY MEDICINE

## 2023-11-29 PROCEDURE — 36415 COLL VENOUS BLD VENIPUNCTURE: CPT | Performed by: HOSPITALIST

## 2023-11-29 PROCEDURE — 99232 SBSQ HOSP IP/OBS MODERATE 35: CPT | Performed by: HOSPITALIST

## 2023-11-29 PROCEDURE — 80048 BASIC METABOLIC PNL TOTAL CA: CPT | Performed by: HOSPITALIST

## 2023-11-29 PROCEDURE — 2500000002 HC RX 250 W HCPCS SELF ADMINISTERED DRUGS (ALT 637 FOR MEDICARE OP, ALT 636 FOR OP/ED): Performed by: FAMILY MEDICINE

## 2023-11-29 PROCEDURE — 2500000004 HC RX 250 GENERAL PHARMACY W/ HCPCS (ALT 636 FOR OP/ED): Performed by: HOSPITALIST

## 2023-11-29 PROCEDURE — 2500000001 HC RX 250 WO HCPCS SELF ADMINISTERED DRUGS (ALT 637 FOR MEDICARE OP): Performed by: INTERNAL MEDICINE

## 2023-11-29 RX ORDER — AMLODIPINE BESYLATE 2.5 MG/1
2.5 TABLET ORAL DAILY
Status: COMPLETED | OUTPATIENT
Start: 2023-11-29 | End: 2023-11-29

## 2023-11-29 RX ORDER — LOSARTAN POTASSIUM 50 MG/1
100 TABLET ORAL DAILY
Status: DISCONTINUED | OUTPATIENT
Start: 2023-11-29 | End: 2023-11-30 | Stop reason: HOSPADM

## 2023-11-29 RX ORDER — HYDRALAZINE HYDROCHLORIDE 20 MG/ML
5 INJECTION INTRAMUSCULAR; INTRAVENOUS EVERY 6 HOURS PRN
Status: DISCONTINUED | OUTPATIENT
Start: 2023-11-29 | End: 2023-11-29

## 2023-11-29 RX ORDER — INSULIN GLARGINE 100 [IU]/ML
8 INJECTION, SOLUTION SUBCUTANEOUS EVERY 24 HOURS
Status: DISCONTINUED | OUTPATIENT
Start: 2023-11-29 | End: 2023-11-30 | Stop reason: HOSPADM

## 2023-11-29 RX ORDER — PREDNISONE 20 MG/1
40 TABLET ORAL DAILY
Status: DISCONTINUED | OUTPATIENT
Start: 2023-11-29 | End: 2023-11-30 | Stop reason: HOSPADM

## 2023-11-29 RX ORDER — INSULIN LISPRO 100 [IU]/ML
0-10 INJECTION, SOLUTION INTRAVENOUS; SUBCUTANEOUS
Status: DISCONTINUED | OUTPATIENT
Start: 2023-11-30 | End: 2023-11-30 | Stop reason: HOSPADM

## 2023-11-29 RX ADMIN — HEPARIN SODIUM 7500 UNITS: 5000 INJECTION INTRAVENOUS; SUBCUTANEOUS at 06:22

## 2023-11-29 RX ADMIN — HEPARIN SODIUM 7500 UNITS: 5000 INJECTION INTRAVENOUS; SUBCUTANEOUS at 20:37

## 2023-11-29 RX ADMIN — INSULIN HUMAN 4 UNITS: 100 INJECTION, SOLUTION PARENTERAL at 17:07

## 2023-11-29 RX ADMIN — GUAIFENESIN 1200 MG: 600 TABLET ORAL at 20:37

## 2023-11-29 RX ADMIN — METHYLPREDNISOLONE SODIUM SUCCINATE 40 MG: 40 INJECTION, POWDER, LYOPHILIZED, FOR SOLUTION INTRAMUSCULAR; INTRAVENOUS at 09:47

## 2023-11-29 RX ADMIN — BENZONATATE 200 MG: 100 CAPSULE ORAL at 09:47

## 2023-11-29 RX ADMIN — MONTELUKAST 10 MG: 10 TABLET, FILM COATED ORAL at 20:37

## 2023-11-29 RX ADMIN — INSULIN HUMAN 4 UNITS: 100 INJECTION, SOLUTION PARENTERAL at 11:57

## 2023-11-29 RX ADMIN — INSULIN GLARGINE 8 UNITS: 100 INJECTION, SOLUTION SUBCUTANEOUS at 11:57

## 2023-11-29 RX ADMIN — BENZONATATE 200 MG: 100 CAPSULE ORAL at 17:50

## 2023-11-29 RX ADMIN — BENZONATATE 200 MG: 100 CAPSULE ORAL at 01:23

## 2023-11-29 RX ADMIN — AMLODIPINE BESYLATE 2.5 MG: 2.5 TABLET ORAL at 11:57

## 2023-11-29 RX ADMIN — CARVEDILOL 25 MG: 25 TABLET, FILM COATED ORAL at 17:07

## 2023-11-29 RX ADMIN — Medication 3 MG: at 20:37

## 2023-11-29 RX ADMIN — ASPIRIN 81 MG: 81 TABLET, COATED ORAL at 08:43

## 2023-11-29 RX ADMIN — LOSARTAN POTASSIUM 100 MG: 50 TABLET, FILM COATED ORAL at 08:43

## 2023-11-29 RX ADMIN — THEOPHYLLINE 400 MG: 400 TABLET, EXTENDED RELEASE ORAL at 08:43

## 2023-11-29 RX ADMIN — PREDNISONE 40 MG: 20 TABLET ORAL at 17:50

## 2023-11-29 RX ADMIN — METHYLPREDNISOLONE SODIUM SUCCINATE 40 MG: 40 INJECTION, POWDER, LYOPHILIZED, FOR SOLUTION INTRAMUSCULAR; INTRAVENOUS at 01:23

## 2023-11-29 RX ADMIN — HEPARIN SODIUM 7500 UNITS: 5000 INJECTION INTRAVENOUS; SUBCUTANEOUS at 14:28

## 2023-11-29 RX ADMIN — TIOTROPIUM BROMIDE INHALATION SPRAY 2 PUFF: 3.12 SPRAY, METERED RESPIRATORY (INHALATION) at 08:42

## 2023-11-29 RX ADMIN — LORATADINE 10 MG: 10 TABLET ORAL at 08:43

## 2023-11-29 RX ADMIN — FLUTICASONE FUROATE AND VILANTEROL TRIFENATATE 1 PUFF: 200; 25 POWDER RESPIRATORY (INHALATION) at 08:42

## 2023-11-29 RX ADMIN — AZITHROMYCIN MONOHYDRATE 500 MG: 500 INJECTION, POWDER, LYOPHILIZED, FOR SOLUTION INTRAVENOUS at 10:33

## 2023-11-29 RX ADMIN — CEFTRIAXONE SODIUM 2 G: 2 INJECTION, SOLUTION INTRAVENOUS at 09:47

## 2023-11-29 RX ADMIN — PANTOPRAZOLE SODIUM 40 MG: 40 TABLET, DELAYED RELEASE ORAL at 06:23

## 2023-11-29 RX ADMIN — CARVEDILOL 25 MG: 25 TABLET, FILM COATED ORAL at 08:43

## 2023-11-29 ASSESSMENT — COGNITIVE AND FUNCTIONAL STATUS - GENERAL
DAILY ACTIVITIY SCORE: 24
MOBILITY SCORE: 24
DAILY ACTIVITIY SCORE: 24
MOBILITY SCORE: 24

## 2023-11-29 ASSESSMENT — PAIN - FUNCTIONAL ASSESSMENT: PAIN_FUNCTIONAL_ASSESSMENT: 0-10

## 2023-11-29 ASSESSMENT — PAIN SCALES - GENERAL
PAINLEVEL_OUTOF10: 0 - NO PAIN
PAINLEVEL_OUTOF10: 0 - NO PAIN

## 2023-11-29 NOTE — PROGRESS NOTES
..       [facility] Progress Note           Subjective/   58 y.o. year old female who we are seeing in consultation for JOSE.     NAEON  Feeling better, comfortable up in chair   Breathing and swelling better  Remains on supplemental oxygen  Appetite getting better  Making good urine  No N/V/D, chest pain, worsening edema    Cardiovascular ROS: no chest pain or dyspnea on exertion  Respiratory ROS: positive for - shortness of breath, wheezing  Gastrointestinal ROS: no abdominal pain, change in bowel habits, or black or bloody stools      No change in PFSH    All data labs/interval notes and overnight issues are reviewed  Objective/     Vitals:    11/28/23 2112 11/29/23 0441 11/29/23 0624 11/29/23 1045   BP:  (!) 183/98 (!) 174/96 (!) 175/92   BP Location:  Right arm Left arm Right arm   Patient Position:  Lying     Pulse:  60 65 95   Resp: 18 18     Temp: 36.1 °C (97 °F) 36.1 °C (97 °F)     TempSrc: Temporal Temporal     SpO2: 90% 92%     Weight:       Height:            Intake/Output Summary (Last 24 hours) at 11/29/2023 1104  Last data filed at 11/29/2023 1017  Gross per 24 hour   Intake 1020 ml   Output --   Net 1020 ml          aspirin, 81 mg, oral, Daily  azelastine, 1 spray, Each Nostril, BID  azithromycin, 500 mg, intravenous, q24h  carvedilol, 25 mg, oral, BID with meals  cefTRIAXone, 2 g, intravenous, q24h  fluticasone, 1 spray, Each Nostril, BID  tiotropium, 2 Inhalation, inhalation, Daily   And  fluticasone furoate-vilanteroL, 1 puff, inhalation, Daily  heparin (porcine), 7,500 Units, subcutaneous, q8h CARMITA  insulin regular, 0-5 Units, subcutaneous, TID with meals  loratadine, 10 mg, oral, Daily  losartan, 100 mg, oral, Daily  melatonin, 3 mg, oral, Daily  montelukast, 10 mg, oral, Nightly  pantoprazole, 40 mg, oral, Daily before breakfast   Or  pantoprazole, 40 mg, intravenous, Daily before breakfast  pneumococcal conjugate, 0.5 mL, intramuscular, During hospitalization  polyethylene glycol, 17 g,  oral, Daily  predniSONE, 40 mg, oral, Daily  theophylline ER, 400 mg, oral, Daily         Physical Exam  Constitutional:       General: She is not in acute distress.     Appearance: Normal appearance.   HENT:      Head: Normocephalic and atraumatic.   Eyes:      Sclera clear  Cardiovascular:      Rate and Rhythm: Normal rate and regular rhythm.   Pulmonary:      Breath sounds: Diminished throughout. Expiratory wheezes     Comments: Currently on 2 L O2 via NC  Abdominal:      General: Bowel sounds are normal.      Palpations: Abdomen is soft.   Musculoskeletal:      Right lower leg: Edema (1+) present.      Left lower leg: Edema (1+) present.   Skin:     General: Skin is warm and dry.   Neurological:      Mental Status: She is alert and oriented to person, place, and time.    Results from last 7 days   Lab Units 11/29/23  0431 11/28/23  0359 11/27/23  0444   SODIUM mmol/L 134* 132* 132*   POTASSIUM mmol/L 5.1 4.6 4.5   CHLORIDE mmol/L 102 101 100   CO2 mmol/L 25 24 25   BUN mg/dL 25* 26* 33*   CREATININE mg/dL 0.97 1.09* 1.48*   EGFR mL/min/1.73m*2 68 59* 41*   GLUCOSE mg/dL 243* 282* 224*   CALCIUM mg/dL 8.0* 7.9* 8.0*              Assessment/Plan   Assessment   58 y.o. 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 JOSE resolved. baseline ~ 0.8-1.0, non-oliguric, BP stable  -Losartan 100 mg restarted per primary, agree  -BP remains sub-optimal likely in setting of IV solumedrol use  -Pt reports developed significant peripheral edema on 10 mg Amlodipine previously  -Noted, primary initiated on low dose Amlodipine, agree and monitor response  -Hold the torsemide for now. Edema is managed with BLE compression stockings  -UA +ve glucosuria, otherwise bland  -Na level improving  and stable  -K level 5.1 noted, trend and place on Low K diet if worsens  -Abx management for PNA per primary  -No further changes  -Ok for discharge from renal standpoint and follow up in outpatient with Dr. Connor in 1-2 weeks, BMP prior is ordered      We will follow along      Thank you for the consult and the opportunity to participate in the care of this patient. Please do not hesitate to contact us with any questions or concerns.     ADITI Gomez-CNP  [facility], [facility]  [phone]

## 2023-11-29 NOTE — PROGRESS NOTES
Zohra Toledo [id number]   Service: Internal Medicine / Hospitalist Date of service: 11/29/23                                  Full Code            Subjective  Patient seen and examined, lab data and diagnostics reviewed.        History Of Present Illness  Zohra Toledo is a 58 y.o. female presenting with complaint of cough and shortness of breath at least since October.  She is seen several providers regarding this and completed at least 1 round of doxycycline and tapering course of prednisone without significant improvement.  She was seen by the pulmonology clinic here October 30.  She was seen most recently 11/8 in the ED for chest discomfort and felt suitable for discharge back home.  At this time she is not complaining of any chest discomfort.  At the time of the visit she is seen sitting on the edge of the gurney eating a meal.  States she is feeling significantly better on 2 L nasal cannula.  Uses no home O2.  She is very concerned as she wishes to get off of oxygen so she can return to her job as a .  In the ED she was found to have creatinine of 2.05 and appears her baseline is closer to 0.95 which was seen on 11/8.  She denies using any NSAIDs but is on losartan and diuretic at home.  She does not feel that she has not been consuming adequate fluids.  She does not feel she is experiencing any unusual lower extremity edema and typically sees what she is seeing now.  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  Has not really experienced any fever or chills or purulent sputum.  Otherwise denies interval new symptoms or complaints including chest pain palpitations pleuritic type pain nausea abdominal pain flank pain dysuria     ED course: Placed on 2 L nasal cannula though no hypoxia documented in the chart.  Creatinine 2.05 with sodium 126 WBC 12.8 and hemoglobin 12.2.  CXR reveals a new left basilar infiltrate.  Troponin 29 and lactic acid 1.3 with BNP 23      11/27............No reported: acute symptomatology at the time of evaluation including but not limited to :chest pain, abdominal pain, dysuria , nausea, vomiting ,headache, new focal weakness, diaphoresis,fever, chills, syncope,presyncope or palpitations. She however endorsed still having some dyspnea and hoping that she will be off oxygen in light of her job before discharge.        11/28.....................Patient seen and examined this a.m.  Patient reported that overall she felt improved.  She reported as well that she use a BiPAP at home.  No reported chest pains, headaches, fevers, chills, nausea or vomitting.    11/29...............Patient reports overall she is feeling better today, able to be off oxygen at rest she stated.  No reported palpitations, headaches, fevers, chills, nausea or vomiting .  She reported improved ability to ambulate without associated dyspnea today.     Review of Systems:   Review of system otherwise negative if not aforementioned above in subjective.     Objective        Physical Exam      Constitutional:       Appearance: Patient appeared in no acute cardiopulmonary distress.     Comments: Patient alert and oriented to person place time and situation.  HEENT:      Head: Normocephalic and atraumatic.Trachea midline      Nose:No observed congestion or rhinorrhea.     Mouth/Throat: Mucous membranes Moist, Trachea appeared  midline.  Eyes:      Extraocular Movements: Extraocular movements intact.      Pupils: Pupils are equal, round, and reactive to light.      Comments: No scleral icterus or conjunctival injection appreciated.   Cardiovascular:      Rate and Rhythm: Normal rate and regular rhythm. No clicks rubs or gallops, normal S1 and S2.No peripheral stigmata of endocarditis appreciated.     Pulmonary:   Improved air exchange appreciated on auscultation today.No adventitious sounds appreciated.    Abdominal:      General: Abdomen soft, nontender, active bowel sounds, no  involuntary guarding or rebound tenderness appreciated.     Comments: None   Musculoskeletal:       Patient appeared to have full active range of motion for upper and lower extremities, no acute apparent joint deformity appreciated on examination.   No pitting edema or cyanosis appreciated.       Lymphadenopathy:      No appreciable palpable lymphadenopathy  Skin:     General: Skin is warm.      Coloration:  No jaundice     Findings: No abnormal appearing skin rashes or lesions that appeared acute noted on unclothed area of the skin..   Neurological:      General: No focal sensory or motor deficits appreciated, no meningeal signs or dysmetria noted.      Cranial Nerves: Cranial nerves II to XII appearing grossly intact.     Genitals:  Deferred  Psychiatric:         The patient appears to be displaying normal mood and affect at the time of evaluation.     Labs:               Lab Results        Lab Results   Component Value Date    GLUCOSE 243 (H) 11/29/2023    CALCIUM 8.0 (L) 11/29/2023     (L) 11/29/2023    K 5.1 11/29/2023    CO2 25 11/29/2023     11/29/2023    BUN 25 (H) 11/29/2023    CREATININE 0.97 11/29/2023      Lab Results   Component Value Date    WBC 16.1 (H) 11/29/2023    HGB 11.3 (L) 11/29/2023    HCT 35.1 (L) 11/29/2023    MCV 87 11/29/2023     11/29/2023      [unfilled]   [unfilled]   No results found for the last 90 days.                  X-rays/ Images     Procedure Component Value Units Date/Time   XR chest 1 view [[id number]] Collected: 11/26/23 0956   Order Status: Completed Updated: 11/26/23 0956   Narrative:     Interpreted By:  Pablito Holder,  STUDY:  XR CHEST 1 VIEW;  11/26/2023 9:52 am      INDICATION:  Signs/Symptoms:cough, productive, hypoxia.      COMPARISON:  Portable chest, 8 November 2023      ACCESSION NUMBER(S):  [id number]      ORDERING CLINICIAN:  JERMAIN PUENTES      TECHNIQUE:  Single frontal view of the chest; Portable technique      FINDINGS:      The  cardiomediastinal silhouette is unchanged      New left basilar infiltrate      Remainder of lungs clear      No demonstrable pleural effusion or pneumothorax       Impression:     New left basilar infiltrate suspect for pneumonia      MACRO:  None      Signed by: Pablito Holder 11/26/2023 9:55 AM  Dictation workstation:   [id number]                  Medical Problems         Problem List         * (Principal) Pneumonia of left lower lobe due to infectious organism     Benign hypertension     Severe asthma with exacerbation     Edema     Chronic respiratory failure with hypoxia (CMS/HCC)     Laryngopharyngeal reflux (LPR)     Obesity     BOBO (obstructive sleep apnea)     Cellulitis                  Above medical problems may be reflective of historical medical problems that may have resolved and may not related to acute clinical condition/medical problems.     Clinical impression/plan:     Principal Problem:    Pneumonia of left lower lobe due to infectious organism        1.  CAP         Left lower lobe pneumonia likely on CXR.  Apparently failed outpatient antibiotic treatment.  Rocephin and azithromycin empirically.     2.  Acute hypoxic respiratory failure           Likely related to acute pneumonia on underlying asthma.  Could not find hypoxia documented in the chart and remains on 2 L.  Wean as able.     3.  Exacerbation asthma              Continue home medications.  Systemic corticosteroids.  Oxygen supplementation as needed.  Appears stable and improving.     4.  Hyponatremia.              Uncertain etiology at this time though possibly related to pneumonia.  Monitor and work-up if persistent     5.  HTN              Appears good control.  Continue home medications with the exception of losartan.  Also holding diuretics in view of JOSE.     6.  JOSE.               Creatinine 2.05 and appears baseline closer to 0.95.   Nephrology consultation.  Continue to monitor.     7.  Hypothyroid              Continue home  medications.     8.  DM 2.              Does not appear to be on any medications.  Monitor for now.  May experience exacerbation with systemic corticosteroids however.     9.  History of PE/factor V Leiden deficiency               Appears to have been provoked related to procedure at the time.  Not on any systemic anticoagulation.  At this time we will continue with DVT prophylaxis.     10.  Obstructive sleep apnea                     Patient admits she is not wearing CPAP as she feels this makes her feel more short of breath.  Extensive discussion with the patient regarding natural history risks and options and to follow-up with pulmonology clinic regarding this as she is not sure they are aware of her noncompliance.     Additional care plan/disposition: 11/29/2023        Will continue current antimicrobial therapy for pneumonia, further pathogen directed therapy in accordance with culture results.     Blood pressure elevated today possible contribution as well from systematic corticosteroid will add a one-time dose of Norvasc today, de-escalate corticosteroids.  Continue noninvasive positive ventilation therapy at , known history of sleep apnea.  Patient reports that she is chronically on metformin at home for diabetes.  Continue diabetic diet  De-escalation of systematic corticosteroid .  Escalate insulin sliding scale and basal insulin.  Close follow-up with pulmonology concerning asthma disposition.     Continue to monitor renal function closely, nephrology consultation and recommendations appreciated.        The patient was informed of differential diagnosis , work up , plan of care and possible sequelae of clinical disposition.Patient in agreement with plan of care. Further recommendations forthcoming in accordance with patient's clinical disposition and response to care.     Discharge planning:Possible discharge in the next 24hours.     Care time:Less than 55 minutes              Dictation performed with  assistance of voice recognition device therefore transcription errors are possible.

## 2023-11-29 NOTE — PROGRESS NOTES
Zohra Toledo [id number]   Service: Internal Medicine / Hospitalist Date of service: 11/28/23                                  Full Code            Subjective  Patient seen and examined, lab data and diagnostics reviewed.        History Of Present Illness  Zohra Toledo is a 58 y.o. female presenting with complaint of cough and shortness of breath at least since October.  She is seen several providers regarding this and completed at least 1 round of doxycycline and tapering course of prednisone without significant improvement.  She was seen by the pulmonology clinic here October 30.  She was seen most recently 11/8 in the ED for chest discomfort and felt suitable for discharge back home.  At this time she is not complaining of any chest discomfort.  At the time of the visit she is seen sitting on the edge of the gurney eating a meal.  States she is feeling significantly better on 2 L nasal cannula.  Uses no home O2.  She is very concerned as she wishes to get off of oxygen so she can return to her job as a .  In the ED she was found to have creatinine of 2.05 and appears her baseline is closer to 0.95 which was seen on 11/8.  She denies using any NSAIDs but is on losartan and diuretic at home.  She does not feel that she has not been consuming adequate fluids.  She does not feel she is experiencing any unusual lower extremity edema and typically sees what she is seeing now.  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  Has not really experienced any fever or chills or purulent sputum.  Otherwise denies interval new symptoms or complaints including chest pain palpitations pleuritic type pain nausea abdominal pain flank pain dysuria     ED course: Placed on 2 L nasal cannula though no hypoxia documented in the chart.  Creatinine 2.05 with sodium 126 WBC 12.8 and hemoglobin 12.2.  CXR reveals a new left basilar infiltrate.  Troponin 29 and lactic acid 1.3 with BNP 23      11/27............No reported: acute symptomatology at the time of evaluation including but not limited to :chest pain, abdominal pain, dysuria , nausea, vomiting ,headache, new focal weakness, diaphoresis,fever, chills, syncope,presyncope or palpitations. She however endorsed still having some dyspnea and hoping that she will be off oxygen in light of her job before discharge.       11/28.....................Patient seen and examined this a.m.  Patient reported that overall she felt improved.  She reported as well that she use a BiPAP at home.  No reported chest pains, headaches, fevers, chills, nausea vomit     Review of Systems:   Review of system otherwise negative if not aforementioned above in subjective.     Objective        Physical Exam      Constitutional:       Appearance: Patient appeared in no acute cardiopulmonary distress.     Comments: Patient alert and oriented to person place time and situation.  HEENT:      Head: Normocephalic and atraumatic.Trachea midline      Nose:No observed congestion or rhinorrhea.     Mouth/Throat: Mucous membranes Moist, Trachea appeared  midline.  Eyes:      Extraocular Movements: Extraocular movements intact.      Pupils: Pupils are equal, round, and reactive to light.      Comments: No scleral icterus or conjunctival injection appreciated.   Cardiovascular:      Rate and Rhythm: Normal rate and regular rhythm. No clicks rubs or gallops, normal S1 and S2.No peripheral stigmata of endocarditis appreciated.     Pulmonary:   Improved air exchange appreciated on auscultation today, no appreciable wheezing.  Abdominal:      General: Abdomen soft, nontender, active bowel sounds, no involuntary guarding or rebound tenderness appreciated.     Comments: None   Musculoskeletal:       Patient appeared to have full active range of motion for upper and lower extremities, no acute apparent joint deformity appreciated on examination.   No pitting edema or cyanosis appreciated.        Lymphadenopathy:      No appreciable palpable lymphadenopathy  Skin:     General: Skin is warm.      Coloration:  No jaundice     Findings: No abnormal appearing skin rashes or lesions that appeared acute noted on unclothed area of the skin..   Neurological:      General: No focal sensory or motor deficits appreciated, no meningeal signs or dysmetria noted.      Cranial Nerves: Cranial nerves II to XII appearing grossly intact.     Genitals:  Deferred  Psychiatric:         The patient appears to be displaying normal mood and affect at the time of evaluation.     Labs:           Lab Results   Component Value Date     GLUCOSE 224 (H) 11/27/2023     CALCIUM 8.0 (L) 11/27/2023      (L) 11/27/2023     K 4.5 11/27/2023     CO2 25 11/27/2023      11/27/2023     BUN 33 (H) 11/27/2023     CREATININE 1.48 (H) 11/27/2023            Lab Results   Component Value Date     WBC 12.9 (H) 11/27/2023     HGB 10.9 (L) 11/27/2023     HCT 33.0 (L) 11/27/2023     MCV 86 11/27/2023      11/27/2023      Lab Results   Component Value Date    GLUCOSE 282 (H) 11/28/2023    CALCIUM 7.9 (L) 11/28/2023     (L) 11/28/2023    K 4.6 11/28/2023    CO2 24 11/28/2023     11/28/2023    BUN 26 (H) 11/28/2023    CREATININE 1.09 (H) 11/28/2023      [unfilled]   [unfilled]   No results found for the last 90 days.                  X-rays/ Images     Procedure Component Value Units Date/Time   XR chest 1 view [[id number]] Collected: 11/26/23 0956   Order Status: Completed Updated: 11/26/23 0956   Narrative:     Interpreted By:  Pablito Holder,  STUDY:  XR CHEST 1 VIEW;  11/26/2023 9:52 am      INDICATION:  Signs/Symptoms:cough, productive, hypoxia.      COMPARISON:  Portable chest, 8 November 2023      ACCESSION NUMBER(S):  [id number]      ORDERING CLINICIAN:  JERMAIN PUENTES      TECHNIQUE:  Single frontal view of the chest; Portable technique      FINDINGS:      The cardiomediastinal silhouette is unchanged      New  left basilar infiltrate      Remainder of lungs clear      No demonstrable pleural effusion or pneumothorax       Impression:     New left basilar infiltrate suspect for pneumonia      MACRO:  None      Signed by: Pablito Holder 11/26/2023 9:55 AM  Dictation workstation:   [id number]                  Medical Problems         Problem List         * (Principal) Pneumonia of left lower lobe due to infectious organism     Benign hypertension     Severe asthma with exacerbation     Edema     Chronic respiratory failure with hypoxia (CMS/HCC)     Laryngopharyngeal reflux (LPR)     Obesity     BOBO (obstructive sleep apnea)     Cellulitis                  Above medical problems may be reflective of historical medical problems that may have resolved and may not related to acute clinical condition/medical problems.     Clinical impression/plan:     Principal Problem:    Pneumonia of left lower lobe due to infectious organism        1.  CAP         Left lower lobe pneumonia likely on CXR.  Apparently failed outpatient antibiotic treatment.  Rocephin and azithromycin empirically.     2.  Acute hypoxic respiratory failure           Likely related to acute pneumonia on underlying asthma.  Could not find hypoxia documented in the chart and remains on 2 L.  Wean as able.     3.  Exacerbation asthma              Continue home medications.  Systemic corticosteroids.  Oxygen supplementation as needed.  Appears stable and improving.     4.  Hyponatremia.              Uncertain etiology at this time though possibly related to pneumonia.  Monitor and work-up if persistent     5.  HTN              Appears good control.  Continue home medications with the exception of losartan.  Also holding diuretics in view of JOSE.     6.  JOSE.               Creatinine 2.05 and appears baseline closer to 0.95.   Nephrology consultation.  Continue to monitor.     7.  Hypothyroid              Continue home medications.     8.  DM 2.              Does not  appear to be on any medications.  Monitor for now.  May experience exacerbation with systemic corticosteroids however.     9.  History of PE/factor V Leiden deficiency               Appears to have been provoked related to procedure at the time.  Not on any systemic anticoagulation.  At this time we will continue with DVT prophylaxis.     10.  Obstructive sleep apnea                     Patient admits she is not wearing CPAP as she feels this makes her feel more short of breath.  Extensive discussion with the patient regarding natural history risks and options and to follow-up with pulmonology clinic regarding this as she is not sure they are aware of her noncompliance.     Additional care plan/disposition: 11/28        Will continue current antimicrobial therapy for pneumonia, further pathogen directed therapy in accordance with culture results.     Continue systematic corticosteroids  Hemoglobin A1c.  Continue diabetic diet  Anticipate de-escalation of systematic corticosteroid in next 24 hours.  Addition of insulin sliding scale and basal insulin.   Recommend noninvasive positive ventilation therapy patient will have someone bring her noninvasive positive ventilation in today.  Close follow-up with pulmonology concerning asthma disposition.     Continue to monitor renal function closely, nephrology consultation and recommendations appreciated.        The patient was informed of differential diagnosis , work up , plan of care and possible sequelae of clinical disposition.Patient in agreement with plan of care. Further recommendations forthcoming in accordance with patient's clinical disposition and response to care.     Discharge planning:Possible discharge in the next 24hours.     Care time:Less than 55 minutes              Dictation performed with assistance of voice recognition device therefore transcription errors are possible.

## 2023-11-29 NOTE — CARE PLAN
The patient's goals for the shift include    Problem: Fall/Injury  Goal: Not fall by end of shift  Outcome: Progressing  Goal: Be free from injury by end of the shift  Outcome: Progressing  Goal: Verbalize understanding of personal risk factors for fall in the hospital  Outcome: Progressing  Goal: Verbalize understanding of risk factor reduction measures to prevent injury from fall in the home  Outcome: Progressing  Goal: Use assistive devices by end of the shift  Outcome: Progressing  Goal: Pace activities to prevent fatigue by end of the shift  Outcome: Progressing     Problem: Diabetes  Goal: Achieve decreasing blood glucose levels by end of shift  Outcome: Progressing  Goal: Increase stability of blood glucose readings by end of shift  Outcome: Progressing  Goal: Decrease in ketones present in urine by end of shift  Outcome: Progressing  Goal: Maintain electrolyte levels within acceptable range throughout shift  Outcome: Progressing  Goal: Maintain glucose levels >70mg/dl to <250mg/dl throughout shift  Outcome: Progressing  Goal: No changes in neurological exam by end of shift  Outcome: Progressing  Goal: Learn about and adhere to nutrition recommendations by end of shift  Outcome: Progressing  Goal: Vital signs within normal range for age by end of shift  Outcome: Progressing  Goal: Increase self care and/or family involovement by end of shift  Outcome: Progressing  Goal: Receive DSME education by end of shift  Outcome: Progressing     Problem: Respiratory  Goal: Clear secretions with interventions this shift  Outcome: Progressing  Goal: Minimize anxiety/maximize coping throughout shift  Outcome: Progressing  Goal: No signs of respiratory distress (eg. Use of accessory muscles. Peds grunting)  Outcome: Progressing  Goal: Patent airway maintained this shift  Outcome: Progressing  Goal: Tolerate pulmonary toileting this shift  Outcome: Progressing  Goal: Verbalize decreased shortness of breath this  shift  Outcome: Progressing  Goal: Wean oxygen to maintain O2 saturation per order/standard this shift  Outcome: Progressing

## 2023-11-29 NOTE — CARE PLAN
The patient's goals for the shift include  improve comfort measures    The clinical goals for the shift include decreased shortness of breath through shift

## 2023-11-30 ENCOUNTER — PHARMACY VISIT (OUTPATIENT)
Dept: PHARMACY | Facility: CLINIC | Age: 58
End: 2023-11-30
Payer: COMMERCIAL

## 2023-11-30 VITALS
HEIGHT: 66 IN | BODY MASS INDEX: 45 KG/M2 | WEIGHT: 280 LBS | RESPIRATION RATE: 18 BRPM | SYSTOLIC BLOOD PRESSURE: 135 MMHG | OXYGEN SATURATION: 93 % | TEMPERATURE: 97.8 F | DIASTOLIC BLOOD PRESSURE: 78 MMHG | HEART RATE: 60 BPM

## 2023-11-30 LAB
ANION GAP SERPL CALC-SCNC: 10 MMOL/L (ref 10–20)
BUN SERPL-MCNC: 26 MG/DL (ref 6–23)
CALCIUM SERPL-MCNC: 8.2 MG/DL (ref 8.6–10.3)
CHLORIDE SERPL-SCNC: 103 MMOL/L (ref 98–107)
CO2 SERPL-SCNC: 27 MMOL/L (ref 21–32)
CREAT SERPL-MCNC: 1.11 MG/DL (ref 0.5–1.05)
GFR SERPL CREATININE-BSD FRML MDRD: 58 ML/MIN/1.73M*2
GLUCOSE BLD MANUAL STRIP-MCNC: 212 MG/DL (ref 74–99)
GLUCOSE BLD MANUAL STRIP-MCNC: 251 MG/DL (ref 74–99)
GLUCOSE BLD MANUAL STRIP-MCNC: 252 MG/DL (ref 74–99)
GLUCOSE SERPL-MCNC: 253 MG/DL (ref 74–99)
POTASSIUM SERPL-SCNC: 4.7 MMOL/L (ref 3.5–5.3)
SODIUM SERPL-SCNC: 135 MMOL/L (ref 136–145)

## 2023-11-30 PROCEDURE — 96372 THER/PROPH/DIAG INJ SC/IM: CPT | Performed by: FAMILY MEDICINE

## 2023-11-30 PROCEDURE — 80048 BASIC METABOLIC PNL TOTAL CA: CPT | Performed by: HOSPITALIST

## 2023-11-30 PROCEDURE — 36415 COLL VENOUS BLD VENIPUNCTURE: CPT | Performed by: HOSPITALIST

## 2023-11-30 PROCEDURE — 2500000004 HC RX 250 GENERAL PHARMACY W/ HCPCS (ALT 636 FOR OP/ED): Performed by: FAMILY MEDICINE

## 2023-11-30 PROCEDURE — 82947 ASSAY GLUCOSE BLOOD QUANT: CPT

## 2023-11-30 PROCEDURE — 2500000001 HC RX 250 WO HCPCS SELF ADMINISTERED DRUGS (ALT 637 FOR MEDICARE OP): Performed by: FAMILY MEDICINE

## 2023-11-30 PROCEDURE — 2500000001 HC RX 250 WO HCPCS SELF ADMINISTERED DRUGS (ALT 637 FOR MEDICARE OP): Performed by: INTERNAL MEDICINE

## 2023-11-30 PROCEDURE — 2500000004 HC RX 250 GENERAL PHARMACY W/ HCPCS (ALT 636 FOR OP/ED): Performed by: HOSPITALIST

## 2023-11-30 PROCEDURE — 2500000002 HC RX 250 W HCPCS SELF ADMINISTERED DRUGS (ALT 637 FOR MEDICARE OP, ALT 636 FOR OP/ED): Performed by: HOSPITALIST

## 2023-11-30 PROCEDURE — RXMED WILLOW AMBULATORY MEDICATION CHARGE

## 2023-11-30 PROCEDURE — 2500000002 HC RX 250 W HCPCS SELF ADMINISTERED DRUGS (ALT 637 FOR MEDICARE OP, ALT 636 FOR OP/ED): Performed by: FAMILY MEDICINE

## 2023-11-30 PROCEDURE — 99239 HOSP IP/OBS DSCHRG MGMT >30: CPT | Performed by: HOSPITALIST

## 2023-11-30 RX ORDER — AZELASTINE 1 MG/ML
1 SPRAY, METERED NASAL 2 TIMES DAILY
Start: 2023-11-30 | End: 2023-12-06 | Stop reason: WASHOUT

## 2023-11-30 RX ORDER — THEOPHYLLINE 400 MG/1
400 TABLET, EXTENDED RELEASE ORAL DAILY
Start: 2023-12-01 | End: 2023-12-19 | Stop reason: SDUPTHER

## 2023-11-30 RX ORDER — PREDNISONE 20 MG/1
40 TABLET ORAL DAILY
Qty: 14 TABLET | Refills: 0 | Status: SHIPPED | OUTPATIENT
Start: 2023-11-30 | End: 2023-11-30 | Stop reason: HOSPADM

## 2023-11-30 RX ORDER — CEFDINIR 300 MG/1
300 CAPSULE ORAL 2 TIMES DAILY
Qty: 5 CAPSULE | Refills: 0 | Status: SHIPPED | OUTPATIENT
Start: 2023-11-30 | End: 2023-12-03

## 2023-11-30 RX ORDER — CEFDINIR 300 MG/1
300 CAPSULE ORAL 2 TIMES DAILY
Status: DISCONTINUED | OUTPATIENT
Start: 2023-11-30 | End: 2023-11-30 | Stop reason: HOSPADM

## 2023-11-30 RX ORDER — CARVEDILOL 25 MG/1
25 TABLET ORAL
Qty: 60 TABLET | Refills: 0 | Status: SHIPPED | OUTPATIENT
Start: 2023-11-30 | End: 2024-02-28 | Stop reason: WASHOUT

## 2023-11-30 RX ORDER — PREDNISONE 5 MG/1
TABLET ORAL
Qty: 30 TABLET | Refills: 0 | Status: SHIPPED | OUTPATIENT
Start: 2023-11-30 | End: 2023-12-10

## 2023-11-30 RX ORDER — BENZONATATE 200 MG/1
200 CAPSULE ORAL 3 TIMES DAILY PRN
Qty: 20 CAPSULE | Refills: 0 | Status: SHIPPED | OUTPATIENT
Start: 2023-11-30 | End: 2024-01-02 | Stop reason: SDUPTHER

## 2023-11-30 RX ORDER — FLUTICASONE PROPIONATE 50 MCG
1 SPRAY, SUSPENSION (ML) NASAL 2 TIMES DAILY
Qty: 16 G | Refills: 12 | Status: SHIPPED | OUTPATIENT
Start: 2023-11-30

## 2023-11-30 RX ADMIN — THEOPHYLLINE 400 MG: 400 TABLET, EXTENDED RELEASE ORAL at 08:55

## 2023-11-30 RX ADMIN — INSULIN LISPRO 6 UNITS: 100 INJECTION, SOLUTION INTRAVENOUS; SUBCUTANEOUS at 17:21

## 2023-11-30 RX ADMIN — INSULIN GLARGINE 8 UNITS: 100 INJECTION, SOLUTION SUBCUTANEOUS at 11:44

## 2023-11-30 RX ADMIN — CARVEDILOL 25 MG: 25 TABLET, FILM COATED ORAL at 17:21

## 2023-11-30 RX ADMIN — PREDNISONE 40 MG: 20 TABLET ORAL at 17:21

## 2023-11-30 RX ADMIN — TIOTROPIUM BROMIDE INHALATION SPRAY 2 PUFF: 3.12 SPRAY, METERED RESPIRATORY (INHALATION) at 05:40

## 2023-11-30 RX ADMIN — INSULIN LISPRO 6 UNITS: 100 INJECTION, SOLUTION INTRAVENOUS; SUBCUTANEOUS at 11:45

## 2023-11-30 RX ADMIN — HEPARIN SODIUM 7500 UNITS: 5000 INJECTION INTRAVENOUS; SUBCUTANEOUS at 14:45

## 2023-11-30 RX ADMIN — LORATADINE 10 MG: 10 TABLET ORAL at 08:55

## 2023-11-30 RX ADMIN — FLUTICASONE FUROATE AND VILANTEROL TRIFENATATE 1 PUFF: 200; 25 POWDER RESPIRATORY (INHALATION) at 05:40

## 2023-11-30 RX ADMIN — INSULIN LISPRO 4 UNITS: 100 INJECTION, SOLUTION INTRAVENOUS; SUBCUTANEOUS at 08:57

## 2023-11-30 RX ADMIN — AZITHROMYCIN MONOHYDRATE 500 MG: 500 INJECTION, POWDER, LYOPHILIZED, FOR SOLUTION INTRAVENOUS at 10:26

## 2023-11-30 RX ADMIN — BENZONATATE 200 MG: 100 CAPSULE ORAL at 05:18

## 2023-11-30 RX ADMIN — CARVEDILOL 25 MG: 25 TABLET, FILM COATED ORAL at 08:55

## 2023-11-30 RX ADMIN — ASPIRIN 81 MG: 81 TABLET, COATED ORAL at 08:55

## 2023-11-30 RX ADMIN — CEFTRIAXONE SODIUM 2 G: 2 INJECTION, SOLUTION INTRAVENOUS at 09:41

## 2023-11-30 RX ADMIN — BENZONATATE 200 MG: 100 CAPSULE ORAL at 14:48

## 2023-11-30 RX ADMIN — HEPARIN SODIUM 7500 UNITS: 5000 INJECTION INTRAVENOUS; SUBCUTANEOUS at 05:17

## 2023-11-30 RX ADMIN — LOSARTAN POTASSIUM 100 MG: 50 TABLET, FILM COATED ORAL at 08:55

## 2023-11-30 RX ADMIN — GUAIFENESIN 1200 MG: 600 TABLET ORAL at 09:02

## 2023-11-30 RX ADMIN — PANTOPRAZOLE SODIUM 40 MG: 40 TABLET, DELAYED RELEASE ORAL at 05:18

## 2023-11-30 ASSESSMENT — PAIN - FUNCTIONAL ASSESSMENT: PAIN_FUNCTIONAL_ASSESSMENT: 0-10

## 2023-11-30 ASSESSMENT — COGNITIVE AND FUNCTIONAL STATUS - GENERAL
MOBILITY SCORE: 24
DAILY ACTIVITIY SCORE: 24

## 2023-11-30 ASSESSMENT — PAIN SCALES - GENERAL: PAINLEVEL_OUTOF10: 0 - NO PAIN

## 2023-11-30 NOTE — PROGRESS NOTES
..       [facility] Progress Note           Subjective/   58 y.o. year old female who we are seeing in consultation for JOSE.     NAEON  Feeling better, comfortable up in chair   Breathing and swelling better  Not requiring supplemental O2 today   Appetite getting better  Making good urine  No N/V/D, chest pain, worsening edema    Cardiovascular ROS: no chest pain or dyspnea on exertion  Respiratory ROS: positive for - shortness of breath, wheezing  Gastrointestinal ROS: no abdominal pain, change in bowel habits, or black or bloody stools      No change in PFSH    All data labs/interval notes and overnight issues are reviewed  Objective/     Vitals:    11/29/23 1350 11/29/23 2055 11/30/23 0507 11/30/23 0807   BP: 129/75 142/85 (!) 188/80 146/78   BP Location: Right arm Left arm Left arm Left arm   Patient Position: Lying Lying Lying Lying   Pulse: 71 65 61 65   Resp: 18 18 20 19   Temp: 36.9 °C (98.5 °F) 36.3 °C (97.3 °F) 36.4 °C (97.6 °F) 36.2 °C (97.1 °F)   TempSrc: Temporal Temporal Temporal Temporal   SpO2: 91% 93% 94% 94%   Weight:       Height:            Intake/Output Summary (Last 24 hours) at 11/30/2023 1057  Last data filed at 11/30/2023 0756  Gross per 24 hour   Intake 1225 ml   Output --   Net 1225 ml        aspirin, 81 mg, oral, Daily  azelastine, 1 spray, Each Nostril, BID  azithromycin, 500 mg, intravenous, q24h  carvedilol, 25 mg, oral, BID with meals  cefTRIAXone, 2 g, intravenous, q24h  fluticasone, 1 spray, Each Nostril, BID  tiotropium, 2 Inhalation, inhalation, Daily   And  fluticasone furoate-vilanteroL, 1 puff, inhalation, Daily  heparin (porcine), 7,500 Units, subcutaneous, q8h CARMITA  insulin glargine, 8 Units, subcutaneous, q24h  insulin lispro, 0-10 Units, subcutaneous, TID with meals  loratadine, 10 mg, oral, Daily  losartan, 100 mg, oral, Daily  melatonin, 3 mg, oral, Daily  montelukast, 10 mg, oral, Nightly  pantoprazole, 40 mg, oral, Daily before breakfast   Or  pantoprazole, 40  mg, intravenous, Daily before breakfast  pneumococcal conjugate, 0.5 mL, intramuscular, During hospitalization  polyethylene glycol, 17 g, oral, Daily  predniSONE, 40 mg, oral, Daily  theophylline ER, 400 mg, oral, Daily         Physical Exam  Constitutional:       General: She is not in acute distress.     Appearance: Normal appearance.   HENT:      Head: Normocephalic and atraumatic.   Eyes:      Sclera clear  Cardiovascular:      Rate and Rhythm: Normal rate and regular rhythm.   Pulmonary:      Breath sounds: Diminished throughout. Expiratory wheezes  Abdominal:      General: Bowel sounds are normal.      Palpations: Abdomen is soft.   Musculoskeletal:      Right lower leg: Edema (1+) present.      Left lower leg: Edema (1+) present.   Skin:     General: Skin is warm and dry.   Neurological:      Mental Status: She is alert and oriented to person, place, and time.    Results from last 7 days   Lab Units 11/30/23  0514 11/29/23  0431 11/28/23  0359   SODIUM mmol/L 135* 134* 132*   POTASSIUM mmol/L 4.7 5.1 4.6   CHLORIDE mmol/L 103 102 101   CO2 mmol/L 27 25 24   BUN mg/dL 26* 25* 26*   CREATININE mg/dL 1.11* 0.97 1.09*   EGFR mL/min/1.73m*2 58* 68 59*   GLUCOSE mg/dL 253* 243* 282*   CALCIUM mg/dL 8.2* 8.0* 7.9*            Assessment/Plan   Assessment   58 y.o. 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 JOSE resolved. baseline ~ 0.8-1.0, non-oliguric, BP stable  -Losartan 100 mg restarted per primary, agree  -BP remains sub-optimal likely in setting of IV solumedrol use  -Pt reports developed significant peripheral edema on 10 mg Amlodipine previously  -Cont on low dose Amlodipine<< tolerating, no edema since  initiation   -Hold the torsemide for now. Edema is managed with BLE compression stockings  -UA +ve glucosuria, otherwise bland. No proteinuria  -Na level improving and stable  -K level 5.1 noted, trend and place on Low K diet if worsens<< 4.7 today (11/30)  -Abx management for PNA per primary  -No further changes  -Ok for discharge from renal standpoint and follow up in outpatient with Dr. Connor in 1-2 weeks, BMP prior is ordered      We will follow along    ADITI Gomez-[facility], [facility]  [phone]      *note not finalized until signed by an attending

## 2023-11-30 NOTE — DISCHARGE SUMMARY
Discharge Summary    Zohra Toledo    [id number]     11/26/2023  7:14 AM , admit date    11/30/23, discharge date      .      Discharge Diagnosis:        1.  Community-acquired pneumonia    2.  Acute hypoxic respiratory failure secondary to pneumonia and CHF and some exacerbation    3.  Asthma exacerbation    4.  Hypertension    5.  Acute kidney injury    6.  Diabetes mellitus    7.  Moderate asymptomatic hyponatremia with some distributional component from hyperglycemia.    8.  Acute kidney injury    9.  Obesity evident by BMI 45.19, safe weight loss recommended    Problem List Items Addressed This Visit             ICD-10-CM       Pulmonary and Pneumonias    * (Principal) Pneumonia of left lower lobe due to infectious organism - Primary J18.9          Hospital Course:    58-year-old  female with reported history of asthma presenting with cough and shortness of breath she stated since October.  Of completed rounds she believed of doxycycline and prednisone without interval improvement.  On presentation chest radiograph reflected a new left basilar infiltrate she required supplemental oxygen on presentation of 2 L and later was weaned to room here before discharge.  She was placed on empirical antibiotic therapy as well as systematic corticosteroids no showed marked clinical improvement with supportive interventions implemented.  She was counseled to resume her chronic antihypertensive therapy at home.  Hyperglycemia iatrogenic related to corticosteroids were appreciated during hospitalization she was placed on basal insulin and insulin sliding scale and she will resume her metformin on discharge.  Overall rest of her hospital course appears uneventful patient responded well supportive interventions implemented.  She was seen in consultation by nephrology service concerning acute kidney injury..  Was recommended to continue losartan will hold torsemide for now reports a history of occasional edema in her legs  but gets severe cramps she stated from diuretics.  Outpatient follow-up with nephrology concerning monitoring renal function.She appeared clinically stable and fit for discharge today and was in agreement with discharge.  Patient should seek medical attention immediately if condition should worsen, new symptoms develop , no further improvement or recurrence of presenting symptomatology, patient warned.        Progress note on the date of  discharge.    Zohra Toledo [id number]   Service: Internal Medicine / Hospitalist Date of service: 11/30/23                                  Full Code            Subjective  Patient seen and examined, lab data and diagnostics reviewed.        History Of Present Illness  Zohra Toledo is a 58 y.o. female presenting with complaint of cough and shortness of breath at least since October.  She is seen several providers regarding this and completed at least 1 round of doxycycline and tapering course of prednisone without significant improvement.  She was seen by the pulmonology clinic here October 30.  She was seen most recently 11/8 in the ED for chest discomfort and felt suitable for discharge back home.  At this time she is not complaining of any chest discomfort.  At the time of the visit she is seen sitting on the edge of the gurney eating a meal.  States she is feeling significantly better on 2 L nasal cannula.  Uses no home O2.  She is very concerned as she wishes to get off of oxygen so she can return to her job as a .  In the ED she was found to have creatinine of 2.05 and appears her baseline is closer to 0.95 which was seen on 11/8.  She denies using any NSAIDs but is on losartan and diuretic at home.  She does not feel that she has not been consuming adequate fluids.  She does not feel she is experiencing any unusual lower extremity edema and typically sees what she is seeing now.  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  Has  not really experienced any fever or chills or purulent sputum.  Otherwise denies interval new symptoms or complaints including chest pain palpitations pleuritic type pain nausea abdominal pain flank pain dysuria     ED course: Placed on 2 L nasal cannula though no hypoxia documented in the chart.  Creatinine 2.05 with sodium 126 WBC 12.8 and hemoglobin 12.2.  CXR reveals a new left basilar infiltrate.  Troponin 29 and lactic acid 1.3 with BNP 23     11/27............No reported: acute symptomatology at the time of evaluation including but not limited to :chest pain, abdominal pain, dysuria , nausea, vomiting ,headache, new focal weakness, diaphoresis,fever, chills, syncope,presyncope or palpitations. She however endorsed still having some dyspnea and hoping that she will be off oxygen in light of her job before discharge.        11/28.....................Patient seen and examined this a.m.  Patient reported that overall she felt improved.  She reported as well that she use a BiPAP at home.  No reported chest pains, headaches, fevers, chills, nausea or vomitting.     11/29...............Patient reports overall she is feeling better today, able to be off oxygen at rest she stated.  No reported palpitations, headaches, fevers, chills, nausea or vomiting .  She reported improved ability to ambulate without associated dyspnea today.     11/30...........................Patient reported that overall she was doing very well today she disclosed no dyspnea at rest or dyspnea with activity.  The patient voiced no new complaints.     Review of Systems:   Review of system otherwise negative if not aforementioned above in subjective.     Objective        Physical Exam      Constitutional:       Appearance: Patient appeared in no acute cardiopulmonary distress.     Comments: Patient alert and oriented to person place time and situation.  HEENT:      Head: Normocephalic and atraumatic.Trachea midline      Nose:No observed  congestion or rhinorrhea.     Mouth/Throat: Mucous membranes Moist, Trachea appeared  midline.  Eyes:      Extraocular Movements: Extraocular movements intact.      Pupils: Pupils are equal, round, and reactive to light.      Comments: No scleral icterus or conjunctival injection appreciated.   Cardiovascular:      Rate and Rhythm: Normal rate and regular rhythm. No clicks rubs or gallops, normal S1 and S2.No peripheral stigmata of endocarditis appreciated.     Pulmonary:   Improved air exchange appreciated on auscultation today.No adventitious sounds appreciated.     Abdominal:      General: Abdomen soft, nontender, active bowel sounds, no involuntary guarding or rebound tenderness appreciated.     Comments: None   Musculoskeletal:       Patient appeared to have full active range of motion for upper and lower extremities, no acute apparent joint deformity appreciated on examination.   No pitting edema or cyanosis appreciated.       Lymphadenopathy:      No appreciable palpable lymphadenopathy  Skin:     General: Skin is warm.      Coloration:  No jaundice     Findings: No abnormal appearing skin rashes or lesions that appeared acute noted on unclothed area of the skin..   Neurological:      General: No focal sensory or motor deficits appreciated, no meningeal signs or dysmetria noted.      Cranial Nerves: Cranial nerves II to XII appearing grossly intact.     Genitals:  Deferred  Psychiatric:         The patient appears to be displaying normal mood and affect at the time of evaluation.     Labs:               Lab Results                   Lab Results   Component Value Date     GLUCOSE 243 (H) 11/29/2023     CALCIUM 8.0 (L) 11/29/2023      (L) 11/29/2023     K 5.1 11/29/2023     CO2 25 11/29/2023      11/29/2023     BUN 25 (H) 11/29/2023     CREATININE 0.97 11/29/2023                Lab Results   Component Value Date     WBC 16.1 (H) 11/29/2023     HGB 11.3 (L) 11/29/2023     HCT 35.1 (L) 11/29/2023      MCV 87 11/29/2023      11/29/2023            Lab Results   Component Value Date     GLUCOSE 253 (H) 11/30/2023     CALCIUM 8.2 (L) 11/30/2023      (L) 11/30/2023     K 4.7 11/30/2023     CO2 27 11/30/2023      11/30/2023     BUN 26 (H) 11/30/2023     CREATININE 1.11 (H) 11/30/2023            Lab Results   Component Value Date     WBC 16.1 (H) 11/29/2023     HGB 11.3 (L) 11/29/2023     HCT 35.1 (L) 11/29/2023     MCV 87 11/29/2023      11/29/2023      [unfilled]   [unfilled]   No results found for the last 90 days.                  X-rays/ Images     Procedure Component Value Units Date/Time   XR chest 1 view [[id number]] Collected: 11/26/23 0956   Order Status: Completed Updated: 11/26/23 0956   Narrative:     Interpreted By:  Pablito Holder,  STUDY:  XR CHEST 1 VIEW;  11/26/2023 9:52 am      INDICATION:  Signs/Symptoms:cough, productive, hypoxia.      COMPARISON:  Portable chest, 8 November 2023      ACCESSION NUMBER(S):  [id number]      ORDERING CLINICIAN:  JERMAIN PUENTES      TECHNIQUE:  Single frontal view of the chest; Portable technique      FINDINGS:      The cardiomediastinal silhouette is unchanged      New left basilar infiltrate      Remainder of lungs clear      No demonstrable pleural effusion or pneumothorax       Impression:     New left basilar infiltrate suspect for pneumonia      MACRO:  None      Signed by: Pablito Holder 11/26/2023 9:55 AM  Dictation workstation:   [id number]                  Medical Problems         Problem List         * (Principal) Pneumonia of left lower lobe due to infectious organism     Benign hypertension     Severe asthma with exacerbation     Edema     Chronic respiratory failure with hypoxia (CMS/HCC)     Laryngopharyngeal reflux (LPR)     Obesity     BOBO (obstructive sleep apnea)     Cellulitis                  Above medical problems may be reflective of historical medical problems that may have resolved and may not related to acute  clinical condition/medical problems.     Clinical impression/plan:     Principal Problem:    Pneumonia of left lower lobe due to infectious organism        1.  CAP         Left lower lobe pneumonia likely on CXR.  Apparently failed outpatient antibiotic treatment.  Rocephin and azithromycin empirically.     2.  Acute hypoxic respiratory failure           Likely related to acute pneumonia on underlying asthma.  Could not find hypoxia documented in the chart and remains on 2 L.  Wean as able.     3.  Exacerbation asthma              Continue home medications.  Systemic corticosteroids.  Oxygen supplementation as needed.  Appears stable and improving.     4.  Hyponatremia.              Uncertain etiology at this time though possibly related to pneumonia.  Monitor and work-up if persistent     5.  HTN              Appears good control.  Continue home medications with the exception of losartan.  Also holding diuretics in view of JOSE.     6.  JOSE.               Creatinine 2.05 and appears baseline closer to 0.95.   Nephrology consultation.  Continue to monitor.     7.  Hypothyroid              Continue home medications.     8.  DM 2.              Does not appear to be on any medications.  Monitor for now.  May experience exacerbation with systemic corticosteroids however.     9.  History of PE/factor V Leiden deficiency               Appears to have been provoked related to procedure at the time.  Not on any systemic anticoagulation.  At this time we will continue with DVT prophylaxis.     10.  Obstructive sleep apnea                     Patient admits she is not wearing CPAP as she feels this makes her feel more short of breath.  Extensive discussion with the patient regarding natural history risks and options and to follow-up with pulmonology clinic regarding this as she is not sure they are aware of her noncompliance.     Additional care plan/disposition: 11/29/2023        Will continue current antimicrobial therapy  for pneumonia, further pathogen directed therapy in accordance with culture results.     Blood pressure elevated today possible contribution as well from systematic corticosteroid will add a one-time dose of Norvasc today, de-escalate corticosteroids.  Continue noninvasive positive ventilation therapy at , known history of sleep apnea.  Patient reports that she is chronically on metformin at home for diabetes.  Continue diabetic diet  De-escalation of systematic corticosteroid .  Escalate insulin sliding scale and basal insulin.  Close follow-up with pulmonology concerning asthma disposition.     Continue to monitor renal function closely, nephrology consultation and recommendations appreciated.        The patient was informed of differential diagnosis , work up , plan of care and possible sequelae of clinical disposition.Patient in agreement with plan of care. Further recommendations forthcoming in accordance with patient's clinical disposition and response to care.     Discharge planning:Plan discharge to home today.  Patient appeared clinically improved and appeared clinically fit for discharge.  Will transition to oral antibiotic therapy, prednisone taper recommend close follow-up with pulmonary service.  Patient should seek medical attention immediately if condition should worsen, new symptoms develop , no further improvement or recurrence of presenting symptomatology, patient warned.              Dictation performed with assistance of voice recognition device therefore transcription errors are possible.   Consultants    Nephrology           Surgeries/Procedures:    None             Your medication list        ASK your doctor about these medications        Instructions Last Dose Given Next Dose Due   albuterol 90 mcg/actuation inhaler      Inhale 2 puffs every 4 hours if needed for wheezing.       aspirin 81 mg EC tablet           azelastine 137 mcg (0.1 %) nasal spray  Commonly known as: Astelin            benzonatate 200 mg capsule  Commonly known as: Tessalon  Ask about: Should I take this medication?      Take 1 capsule (200 mg) by mouth 3 times a day as needed for cough. Do not crush or chew.       carvedilol 25 mg tablet  Commonly known as: Coreg      Take 1 tablet (25 mg) by mouth 2 times a day with meals.       cetirizine 10 mg tablet  Commonly known as: ZyrTEC           cholecalciferol 5,000 Units tablet  Commonly known as: Vitamin D-3           fluticasone 50 mcg/actuation nasal spray  Commonly known as: Flonase           ipratropium-albuteroL 0.5-2.5 mg/3 mL nebulizer solution  Commonly known as: Duo-Neb      Take 3 mL by nebulization every 4 hours if needed for wheezing.       levothyroxine 100 mcg tablet  Commonly known as: Synthroid, Levoxyl      Take 1 tablet (100 mcg) by mouth once daily in the morning. Take before meals. Take on an empty stomach.       losartan 100 mg tablet  Commonly known as: Cozaar      Take 1 tablet (100 mg) by mouth once daily.       metFORMIN 500 mg tablet  Commonly known as: Glucophage      Take 1 tablet (500 mg) by mouth 2 times a day with meals.       montelukast 10 mg tablet  Commonly known as: Singulair      Take 1 tablet (10 mg) by mouth once daily at bedtime.       Mucinex 1,200 mg tablet extended release 12hr  Generic drug: guaiFENesin           Nucala 100 mg/mL auto-injector  Generic drug: mepolizumab           predniSONE 5 mg tablet  Commonly known as: Deltasone           theophylline  mg 24 hr tablet  Commonly known as: Uniphyl      Take 1 tablet (400 mg) by mouth once daily. Do not crush or chew.       torsemide 5 mg tablet  Commonly known as: Demadex      Take 1 tablet (5 mg) by mouth once daily.       Trelegy Ellipta 200-62.5-25 mcg blister with device  Generic drug: fluticasone-umeclidin-vilanter      Inhale 1 puff once daily.       triamcinolone 0.1 % cream  Commonly known as: Kenalog      Apply topically 2 times a day. Apply to affected area 1-2 times  daily as needed.                   Disposition:    Patient appeared hemodynamically stable and clinically fit for discharge.  Patient in agreement discharge.  Patient should seek medical attention immediately if condition should worsen, new symptoms develop , no further improvement or recurrence of presenting symptomatology, patient warned.    Follow-up:    Follow -up with family physician within one week. Recommend follow-up as well with pulmonology.  Close follow-up with family physician concerning chronic medication stewardship patient report not taking Synthroid.  Outpatient follow-up with family physician and TSH levels.  Recommend follow-up within 1 week      Discharge Time :  32 mins      Patient should seek medical attention immediately if condition should worsen , presenting symptoms/conditions do not resolve or new symptoms should developed,patient warned.     Dictation performed with assistance of voice recognition device therefore transcription errors are possible.

## 2023-11-30 NOTE — PROGRESS NOTES
Zohra Toledo [id number]   Service: Internal Medicine / Hospitalist Date of service: 11/30/23                                  Full Code            Subjective  Patient seen and examined, lab data and diagnostics reviewed.        History Of Present Illness  Zohra Toledo is a 58 y.o. female presenting with complaint of cough and shortness of breath at least since October.  She is seen several providers regarding this and completed at least 1 round of doxycycline and tapering course of prednisone without significant improvement.  She was seen by the pulmonology clinic here October 30.  She was seen most recently 11/8 in the ED for chest discomfort and felt suitable for discharge back home.  At this time she is not complaining of any chest discomfort.  At the time of the visit she is seen sitting on the edge of the gurney eating a meal.  States she is feeling significantly better on 2 L nasal cannula.  Uses no home O2.  She is very concerned as she wishes to get off of oxygen so she can return to her job as a .  In the ED she was found to have creatinine of 2.05 and appears her baseline is closer to 0.95 which was seen on 11/8.  She denies using any NSAIDs but is on losartan and diuretic at home.  She does not feel that she has not been consuming adequate fluids.  She does not feel she is experiencing any unusual lower extremity edema and typically sees what she is seeing now.  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  Has not really experienced any fever or chills or purulent sputum.  Otherwise denies interval new symptoms or complaints including chest pain palpitations pleuritic type pain nausea abdominal pain flank pain dysuria     ED course: Placed on 2 L nasal cannula though no hypoxia documented in the chart.  Creatinine 2.05 with sodium 126 WBC 12.8 and hemoglobin 12.2.  CXR reveals a new left basilar infiltrate.  Troponin 29 and lactic acid 1.3 with BNP 23      11/27............No reported: acute symptomatology at the time of evaluation including but not limited to :chest pain, abdominal pain, dysuria , nausea, vomiting ,headache, new focal weakness, diaphoresis,fever, chills, syncope,presyncope or palpitations. She however endorsed still having some dyspnea and hoping that she will be off oxygen in light of her job before discharge.        11/28.....................Patient seen and examined this a.m.  Patient reported that overall she felt improved.  She reported as well that she use a BiPAP at home.  No reported chest pains, headaches, fevers, chills, nausea or vomitting.     11/29...............Patient reports overall she is feeling better today, able to be off oxygen at rest she stated.  No reported palpitations, headaches, fevers, chills, nausea or vomiting .  She reported improved ability to ambulate without associated dyspnea today.    11/30...........................Patient reported that overall she was doing very well today she disclosed no dyspnea at rest or dyspnea with activity.  The patient voiced no new complaints.     Review of Systems:   Review of system otherwise negative if not aforementioned above in subjective.     Objective        Physical Exam      Constitutional:       Appearance: Patient appeared in no acute cardiopulmonary distress.     Comments: Patient alert and oriented to person place time and situation.  HEENT:      Head: Normocephalic and atraumatic.Trachea midline      Nose:No observed congestion or rhinorrhea.     Mouth/Throat: Mucous membranes Moist, Trachea appeared  midline.  Eyes:      Extraocular Movements: Extraocular movements intact.      Pupils: Pupils are equal, round, and reactive to light.      Comments: No scleral icterus or conjunctival injection appreciated.   Cardiovascular:      Rate and Rhythm: Normal rate and regular rhythm. No clicks rubs or gallops, normal S1 and S2.No peripheral stigmata of endocarditis  appreciated.     Pulmonary:   Improved air exchange appreciated on auscultation today.No adventitious sounds appreciated.     Abdominal:      General: Abdomen soft, nontender, active bowel sounds, no involuntary guarding or rebound tenderness appreciated.     Comments: None   Musculoskeletal:       Patient appeared to have full active range of motion for upper and lower extremities, no acute apparent joint deformity appreciated on examination.   No pitting edema or cyanosis appreciated.       Lymphadenopathy:      No appreciable palpable lymphadenopathy  Skin:     General: Skin is warm.      Coloration:  No jaundice     Findings: No abnormal appearing skin rashes or lesions that appeared acute noted on unclothed area of the skin..   Neurological:      General: No focal sensory or motor deficits appreciated, no meningeal signs or dysmetria noted.      Cranial Nerves: Cranial nerves II to XII appearing grossly intact.     Genitals:  Deferred  Psychiatric:         The patient appears to be displaying normal mood and affect at the time of evaluation.     Labs:               Lab Results               Lab Results   Component Value Date     GLUCOSE 243 (H) 11/29/2023     CALCIUM 8.0 (L) 11/29/2023      (L) 11/29/2023     K 5.1 11/29/2023     CO2 25 11/29/2023      11/29/2023     BUN 25 (H) 11/29/2023     CREATININE 0.97 11/29/2023            Lab Results   Component Value Date     WBC 16.1 (H) 11/29/2023     HGB 11.3 (L) 11/29/2023     HCT 35.1 (L) 11/29/2023     MCV 87 11/29/2023      11/29/2023      Lab Results   Component Value Date    GLUCOSE 253 (H) 11/30/2023    CALCIUM 8.2 (L) 11/30/2023     (L) 11/30/2023    K 4.7 11/30/2023    CO2 27 11/30/2023     11/30/2023    BUN 26 (H) 11/30/2023    CREATININE 1.11 (H) 11/30/2023      Lab Results   Component Value Date    WBC 16.1 (H) 11/29/2023    HGB 11.3 (L) 11/29/2023    HCT 35.1 (L) 11/29/2023    MCV 87 11/29/2023     11/29/2023       [unfilled]   [unfilled]   No results found for the last 90 days.                  X-rays/ Images     Procedure Component Value Units Date/Time   XR chest 1 view [[id number]] Collected: 11/26/23 0956   Order Status: Completed Updated: 11/26/23 0956   Narrative:     Interpreted By:  Pablito Holder,  STUDY:  XR CHEST 1 VIEW;  11/26/2023 9:52 am      INDICATION:  Signs/Symptoms:cough, productive, hypoxia.      COMPARISON:  Portable chest, 8 November 2023      ACCESSION NUMBER(S):  [id number]      ORDERING CLINICIAN:  JERMAIN PUENTES      TECHNIQUE:  Single frontal view of the chest; Portable technique      FINDINGS:      The cardiomediastinal silhouette is unchanged      New left basilar infiltrate      Remainder of lungs clear      No demonstrable pleural effusion or pneumothorax       Impression:     New left basilar infiltrate suspect for pneumonia      MACRO:  None      Signed by: Pablito Holder 11/26/2023 9:55 AM  Dictation workstation:   [id number]                  Medical Problems         Problem List         * (Principal) Pneumonia of left lower lobe due to infectious organism     Benign hypertension     Severe asthma with exacerbation     Edema     Chronic respiratory failure with hypoxia (CMS/HCC)     Laryngopharyngeal reflux (LPR)     Obesity     BOBO (obstructive sleep apnea)     Cellulitis                  Above medical problems may be reflective of historical medical problems that may have resolved and may not related to acute clinical condition/medical problems.     Clinical impression/plan:     Principal Problem:    Pneumonia of left lower lobe due to infectious organism        1.  CAP         Left lower lobe pneumonia likely on CXR.  Apparently failed outpatient antibiotic treatment.  Rocephin and azithromycin empirically.     2.  Acute hypoxic respiratory failure           Likely related to acute pneumonia on underlying asthma.  Could not find hypoxia documented in the chart and remains on 2 L.   Wean as able.     3.  Exacerbation asthma              Continue home medications.  Systemic corticosteroids.  Oxygen supplementation as needed.  Appears stable and improving.     4.  Hyponatremia.              Uncertain etiology at this time though possibly related to pneumonia.  Monitor and work-up if persistent     5.  HTN              Appears good control.  Continue home medications with the exception of losartan.  Also holding diuretics in view of JOSE.     6.  JOSE.               Creatinine 2.05 and appears baseline closer to 0.95.   Nephrology consultation.  Continue to monitor.     7.  Hypothyroid              Continue home medications.     8.  DM 2.              Does not appear to be on any medications.  Monitor for now.  May experience exacerbation with systemic corticosteroids however.     9.  History of PE/factor V Leiden deficiency               Appears to have been provoked related to procedure at the time.  Not on any systemic anticoagulation.  At this time we will continue with DVT prophylaxis.     10.  Obstructive sleep apnea                     Patient admits she is not wearing CPAP as she feels this makes her feel more short of breath.  Extensive discussion with the patient regarding natural history risks and options and to follow-up with pulmonology clinic regarding this as she is not sure they are aware of her noncompliance.     Additional care plan/disposition: 11/29/2023        Will continue current antimicrobial therapy for pneumonia, further pathogen directed therapy in accordance with culture results.     Blood pressure elevated today possible contribution as well from systematic corticosteroid will add a one-time dose of Norvasc today, de-escalate corticosteroids.  Continue noninvasive positive ventilation therapy at , known history of sleep apnea.  Patient reports that she is chronically on metformin at home for diabetes.  Continue diabetic diet  De-escalation of systematic corticosteroid  .  Escalate insulin sliding scale and basal insulin.  Close follow-up with pulmonology concerning asthma disposition.     Continue to monitor renal function closely, nephrology consultation and recommendations appreciated.        The patient was informed of differential diagnosis , work up , plan of care and possible sequelae of clinical disposition.Patient in agreement with plan of care. Further recommendations forthcoming in accordance with patient's clinical disposition and response to care.     Discharge planning:Plan discharge to home today.  Patient appeared clinically improved and appeared clinically fit for discharge.  Will transition to oral antibiotic therapy, prednisone taper recommend close follow-up with pulmonary service.  Patient should seek medical attention immediately if condition should worsen, new symptoms develop , no further improvement or recurrence of presenting symptomatology, patient warned.              Dictation performed with assistance of voice recognition device therefore transcription errors are possible.

## 2023-11-30 NOTE — DISCHARGE INSTRUCTIONS
You should seek medical attention immediately if condition should worsen, new symptoms develop , no further improvement or recurrence of presenting symptomatology.    Recommend a follow-up chest x-ray with your pulmonologist or your family physician within 3 to 4 weeks for clearance of your pneumonia.

## 2023-11-30 NOTE — CARE PLAN
Problem: Fall/Injury  Goal: Not fall by end of shift  Outcome: Progressing  Goal: Be free from injury by end of the shift  Outcome: Progressing  Goal: Verbalize understanding of personal risk factors for fall in the hospital  Outcome: Progressing  Goal: Verbalize understanding of risk factor reduction measures to prevent injury from fall in the home  Outcome: Progressing  Goal: Use assistive devices by end of the shift  Outcome: Progressing  Goal: Pace activities to prevent fatigue by end of the shift  Outcome: Progressing     Problem: Diabetes  Goal: Achieve decreasing blood glucose levels by end of shift  Outcome: Progressing  Goal: Increase stability of blood glucose readings by end of shift  Outcome: Progressing  Goal: Decrease in ketones present in urine by end of shift  Outcome: Progressing  Goal: Maintain electrolyte levels within acceptable range throughout shift  Outcome: Progressing  Goal: Maintain glucose levels >70mg/dl to <250mg/dl throughout shift  Outcome: Progressing  Goal: No changes in neurological exam by end of shift  Outcome: Progressing  Goal: Learn about and adhere to nutrition recommendations by end of shift  Outcome: Progressing  Goal: Vital signs within normal range for age by end of shift  Outcome: Progressing  Goal: Increase self care and/or family involovement by end of shift  Outcome: Progressing  Goal: Receive DSME education by end of shift  Outcome: Progressing     Problem: Respiratory  Goal: Clear secretions with interventions this shift  Outcome: Progressing  Goal: Minimize anxiety/maximize coping throughout shift  Outcome: Progressing  Goal: No signs of respiratory distress (eg. Use of accessory muscles. Peds grunting)  Outcome: Progressing  Goal: Patent airway maintained this shift  Outcome: Progressing  Goal: Tolerate pulmonary toileting this shift  Outcome: Progressing  Goal: Verbalize decreased shortness of breath this shift  Outcome: Progressing  Goal: Wean oxygen to  maintain O2 saturation per order/standard this shift  Outcome: Progressing

## 2023-11-30 NOTE — NURSING NOTE
Pt asked for a new coreg order, Winston aware and order put in pt. Pharmacy made me aware pt received new bottle 11/9 but pt stated ordered them to be delivered to her house and they never came. Pt stated would call instead of paying for them here. Dr. Montero made aware.

## 2023-12-01 ENCOUNTER — PATIENT OUTREACH (OUTPATIENT)
Dept: PRIMARY CARE | Facility: CLINIC | Age: 58
End: 2023-12-01
Payer: COMMERCIAL

## 2023-12-01 NOTE — PROGRESS NOTES
Discharge Facility: [facility]   Discharge Diagnosis:  1.  Community-acquired pneumonia     2.  Acute hypoxic respiratory failure secondary to pneumonia and CHF and some exacerbation     3.  Asthma exacerbation     4.  Hypertension     5.  Acute kidney injury     6.  Diabetes mellitus     7.  Moderate asymptomatic hyponatremia with some distributional component from hyperglycemia.     8.  Acute kidney injury     9.  Obesity evident by BMI 45.19, safe weight loss recommended  Admission Date: 11-26-23   Discharge Date:  11-30-23    PCP Appointment Date: New pt. Message routed to office   Specialist Appointment Date: 12-26-23 Cardiology   Hospital Encounter and Summary: Linked   See discharge assessment below for further details  Engagement  Call Start Time: 0959 (12/1/2023 10:01 AM)    Medications  Medications reviewed with patient/caregiver?: Yes (12/1/2023 10:01 AM)  Is the patient having any side effects they believe may be caused by any medication additions or changes?: No (12/1/2023 10:01 AM)  Does the patient have all medications ordered at discharge?: Yes (12/1/2023 10:01 AM)  Care Management Interventions: No intervention needed (12/1/2023 10:01 AM)  Is the patient taking all medications as directed (includes completed medication regime)?: Yes (12/1/2023 10:01 AM)  Care Management Interventions: Provided patient education (12/1/2023 10:01 AM)    Appointments  Does the patient have a primary care provider?: Yes (12/1/2023 10:01 AM)  Care Management Interventions: Verified appointment date/time/provider (12/1/2023 10:01 AM)    Self Management  Has home health visited the patient within 72 hours of discharge?: Not applicable (12/1/2023 10:01 AM)    Patient Teaching  Does the patient have access to their discharge instructions?: Yes (12/1/2023 10:01 AM)  Care Management Interventions: Reviewed instructions with patient (12/1/2023 10:01 AM)  What is the patient's perception of their health status since discharge?:  Same (12/1/2023 10:01 AM)  Is the patient/caregiver able to teach back the hierarchy of who to call/visit for symptoms/problems? PCP, Specialist, Home Health nurse, Urgent Care, ED, 911: Yes (12/1/2023 10:01 AM)      Tonya Alba LPN

## 2023-12-06 ENCOUNTER — OFFICE VISIT (OUTPATIENT)
Dept: PRIMARY CARE | Facility: CLINIC | Age: 58
End: 2023-12-06
Payer: COMMERCIAL

## 2023-12-06 VITALS
RESPIRATION RATE: 16 BRPM | BODY MASS INDEX: 44.58 KG/M2 | WEIGHT: 277.4 LBS | DIASTOLIC BLOOD PRESSURE: 76 MMHG | HEIGHT: 66 IN | HEART RATE: 73 BPM | OXYGEN SATURATION: 96 % | SYSTOLIC BLOOD PRESSURE: 142 MMHG

## 2023-12-06 DIAGNOSIS — N17.9 AKI (ACUTE KIDNEY INJURY) (CMS-HCC): ICD-10-CM

## 2023-12-06 DIAGNOSIS — G47.33 OSA (OBSTRUCTIVE SLEEP APNEA): ICD-10-CM

## 2023-12-06 DIAGNOSIS — J96.11 CHRONIC RESPIRATORY FAILURE WITH HYPOXIA (MULTI): ICD-10-CM

## 2023-12-06 DIAGNOSIS — I10 BENIGN HYPERTENSION: Primary | ICD-10-CM

## 2023-12-06 DIAGNOSIS — E11.9 TYPE 2 DIABETES MELLITUS WITHOUT COMPLICATION, WITHOUT LONG-TERM CURRENT USE OF INSULIN (MULTI): ICD-10-CM

## 2023-12-06 PROBLEM — D68.51 ACTIVATED PROTEIN C RESISTANCE (MULTI): Status: ACTIVE | Noted: 2023-12-06

## 2023-12-06 PROCEDURE — 1036F TOBACCO NON-USER: CPT | Performed by: STUDENT IN AN ORGANIZED HEALTH CARE EDUCATION/TRAINING PROGRAM

## 2023-12-06 PROCEDURE — 3077F SYST BP >= 140 MM HG: CPT | Performed by: STUDENT IN AN ORGANIZED HEALTH CARE EDUCATION/TRAINING PROGRAM

## 2023-12-06 PROCEDURE — 3078F DIAST BP <80 MM HG: CPT | Performed by: STUDENT IN AN ORGANIZED HEALTH CARE EDUCATION/TRAINING PROGRAM

## 2023-12-06 PROCEDURE — 3008F BODY MASS INDEX DOCD: CPT | Performed by: STUDENT IN AN ORGANIZED HEALTH CARE EDUCATION/TRAINING PROGRAM

## 2023-12-06 PROCEDURE — 99496 TRANSJ CARE MGMT HIGH F2F 7D: CPT | Performed by: STUDENT IN AN ORGANIZED HEALTH CARE EDUCATION/TRAINING PROGRAM

## 2023-12-06 PROCEDURE — 3044F HG A1C LEVEL LT 7.0%: CPT | Performed by: STUDENT IN AN ORGANIZED HEALTH CARE EDUCATION/TRAINING PROGRAM

## 2023-12-06 PROCEDURE — 4010F ACE/ARB THERAPY RXD/TAKEN: CPT | Performed by: STUDENT IN AN ORGANIZED HEALTH CARE EDUCATION/TRAINING PROGRAM

## 2023-12-06 RX ORDER — CALCIUM CARBONATE 300MG(750)
400 TABLET,CHEWABLE ORAL DAILY
COMMUNITY

## 2023-12-06 ASSESSMENT — PATIENT HEALTH QUESTIONNAIRE - PHQ9
2. FEELING DOWN, DEPRESSED OR HOPELESS: NOT AT ALL
SUM OF ALL RESPONSES TO PHQ9 QUESTIONS 1 AND 2: 0
1. LITTLE INTEREST OR PLEASURE IN DOING THINGS: NOT AT ALL

## 2023-12-06 ASSESSMENT — ENCOUNTER SYMPTOMS
DEPRESSION: 0
OCCASIONAL FEELINGS OF UNSTEADINESS: 0
LOSS OF SENSATION IN FEET: 0

## 2023-12-06 NOTE — PROGRESS NOTES
"Patient: Zohra Toledo  : 1965  PCP: Lyndsay Hubbard DO  MRN: [id number]  Program: No linked episodes     Zohra Toledo is a 58 y.o. female presenting today for follow-up after being discharged from the hospital 6 days ago. The main problem requiring admission was pneumonia. The discharge summary and/or Transitional Care Management documentation was reviewed. Medication reconciliation was performed as indicated via the \"Pablito as Reviewed\" timestamp.     Zohra Toledo was contacted by Transitional Care Management services two days after her discharge. This encounter and supporting documentation was reviewed.    The complexity of medical decision making for this patient's transitional care is high.    Patient treated during admission with doxycyline and prednisone, then when radiography showing new infiltrate added 2L of o2    Hyperglycemia iatrogenic related to corticosteroids were appreciated during hospitalization she was placed on basal insulin and insulin sliding scale and she will resume her metformin on discharge.     She had an JOSE, held torsemide   She continues to not take this due to muscle cramps     She is concerned reporting prior to admission she was taking losartan 100mg BID, and on discharge she was only given once daily  She was taking amlodipine and no no longer     Has 4 more days of prednisone   Feels lightheaded with this medication   Oxygen levels at home have been ok    Back to using CPAP      2023    10:45 AM 2023     1:50 PM 2023     8:55 PM 2023     5:07 AM 2023     8:07 AM 2023     2:15 PM 2023     7:09 AM   Vitals   Systolic 175 129 142 188 146 135 142   Diastolic 92 75 85 80 78 78 76   Heart Rate 95 71 65 61 65 60 73   Temp  36.9 °C (98.5 °F) 36.3 °C (97.3 °F) 36.4 °C (97.6 °F) 36.2 °C (97.1 °F) 36.6 °C (97.8 °F)    Resp  18 18 20 19 18 16   Height (in)       1.676 m (5' 6\")   Weight (lb)       277.4   BMI       44.77 kg/m2   BSA (m2)       " 2.42 m2   Visit Report       Report     Physical Exam    Assessment/Plan   Problem List Items Addressed This Visit             ICD-10-CM    Benign hypertension - Primary I10    Chronic respiratory failure with hypoxia (CMS/[facility]) J96.11    Relevant Orders    Follow Up In [facility] - PCP - Established    BOBO (obstructive sleep apnea) (Chronic) G47.33     Back on CPAP and doing well          Activated protein C resistance (CMS/[facility]) D68.51    Type 2 diabetes mellitus without complication, without long-term current use of insulin (CMS/[facility]) E11.9     Other Visit Diagnoses         Codes    RICCARDO (acute kidney injury) (CMS/[facility])     N17.9          New patient- report build and review of goals    Counseling on proper losartan dosing, not to exceed 100mg daily  Bp is likely elevated due to continued prednisone use  Will recheck in 1 month, if still elevated will add low dose norvasc, tolerated this well in the past    With Riccardo and cramping ok to remain off toresmide, no hx of CHF, but of dependent edema from working as a   Continue magnesium aim for 400mg dosing for cramps  Iron levels reviewed and WNL    Given prolonged prednisone course I anticipate ha1c will not be accurate, hold on recheck  Denies any hyper or hypoglycemia symptoms on metformin, will recheck A1c after off prednisone     Back and CPAP  Complete full prednisone course  Pulse ox at home excellent on RA  Continue PRN albuterol, tessalon PRN, Zyrtec,duoneb PRN, Nucala, montelukast, trelegy, mucinex  Has appt in 2 weeks with pulm team to follow up as well     Review of Systems    No family history on file.    Engagement  Call Start Time: 0959 (12/1/2023 10:01 AM)    Medications  Medications reviewed with patient/caregiver?: Yes (12/1/2023 10:01 AM)  Is the patient having any side effects they believe may be caused by any medication additions or changes?: No (12/1/2023 10:01 AM)  Does the patient have all medications ordered at discharge?: Yes  (12/1/2023 10:01 AM)  Care Management Interventions: No intervention needed (12/1/2023 10:01 AM)  Is the patient taking all medications as directed (includes completed medication regime)?: Yes (12/1/2023 10:01 AM)  Care Management Interventions: Provided patient education (12/1/2023 10:01 AM)    Appointments  Does the patient have a primary care provider?: Yes (12/1/2023 10:01 AM)  Care Management Interventions: Verified appointment date/time/provider (12/1/2023 10:01 AM)    Self Management  Has home health visited the patient within 72 hours of discharge?: Not applicable (12/1/2023 10:01 AM)    Patient Teaching  Does the patient have access to their discharge instructions?: Yes (12/1/2023 10:01 AM)  Care Management Interventions: Reviewed instructions with patient (12/1/2023 10:01 AM)  What is the patient's perception of their health status since discharge?: Same (12/1/2023 10:01 AM)  Is the patient/caregiver able to teach back the hierarchy of who to call/visit for symptoms/problems? PCP, Specialist, Home Health nurse, Urgent Care, ED, 911: Yes (12/1/2023 10:01 AM)

## 2023-12-06 NOTE — PATIENT INSTRUCTIONS
Aim for 400mg magnesium for cramps  100mg losartan daily not twice daily   OK to continue to hold torsemide   
11-May-2021
11-May-2021

## 2023-12-11 ENCOUNTER — TELEPHONE (OUTPATIENT)
Dept: PRIMARY CARE | Facility: CLINIC | Age: 58
End: 2023-12-11
Payer: COMMERCIAL

## 2023-12-11 DIAGNOSIS — E11.9 TYPE 2 DIABETES MELLITUS WITHOUT COMPLICATION, WITHOUT LONG-TERM CURRENT USE OF INSULIN (MULTI): ICD-10-CM

## 2023-12-11 RX ORDER — METFORMIN HYDROCHLORIDE 500 MG/1
500 TABLET ORAL
Qty: 180 TABLET | Refills: 1 | Status: SHIPPED | OUTPATIENT
Start: 2023-12-11 | End: 2024-05-20

## 2023-12-19 ENCOUNTER — OFFICE VISIT (OUTPATIENT)
Dept: PULMONOLOGY | Facility: HOSPITAL | Age: 58
End: 2023-12-19
Payer: COMMERCIAL

## 2023-12-19 ENCOUNTER — PATIENT OUTREACH (OUTPATIENT)
Dept: PRIMARY CARE | Facility: CLINIC | Age: 58
End: 2023-12-19
Payer: COMMERCIAL

## 2023-12-19 VITALS
WEIGHT: 289 LBS | RESPIRATION RATE: 16 BRPM | HEART RATE: 86 BPM | SYSTOLIC BLOOD PRESSURE: 150 MMHG | OXYGEN SATURATION: 95 % | DIASTOLIC BLOOD PRESSURE: 86 MMHG | BODY MASS INDEX: 46.45 KG/M2 | HEIGHT: 66 IN | TEMPERATURE: 96 F

## 2023-12-19 DIAGNOSIS — J44.9 CHRONIC OBSTRUCTIVE PULMONARY DISEASE, UNSPECIFIED COPD TYPE (MULTI): ICD-10-CM

## 2023-12-19 DIAGNOSIS — J18.9 PNEUMONIA DUE TO INFECTIOUS ORGANISM, UNSPECIFIED LATERALITY, UNSPECIFIED PART OF LUNG: Primary | ICD-10-CM

## 2023-12-19 DIAGNOSIS — J45.51 SEVERE PERSISTENT ASTHMA WITH EXACERBATION (MULTI): ICD-10-CM

## 2023-12-19 DIAGNOSIS — J30.9 ALLERGIC RHINITIS, UNSPECIFIED SEASONALITY, UNSPECIFIED TRIGGER: ICD-10-CM

## 2023-12-19 PROCEDURE — 99213 OFFICE O/P EST LOW 20 MIN: CPT | Mod: ZK | Performed by: NURSE PRACTITIONER

## 2023-12-19 PROCEDURE — 1036F TOBACCO NON-USER: CPT | Performed by: NURSE PRACTITIONER

## 2023-12-19 PROCEDURE — 99213 OFFICE O/P EST LOW 20 MIN: CPT | Performed by: NURSE PRACTITIONER

## 2023-12-19 PROCEDURE — 3077F SYST BP >= 140 MM HG: CPT | Performed by: NURSE PRACTITIONER

## 2023-12-19 PROCEDURE — 4010F ACE/ARB THERAPY RXD/TAKEN: CPT | Performed by: NURSE PRACTITIONER

## 2023-12-19 PROCEDURE — 3044F HG A1C LEVEL LT 7.0%: CPT | Performed by: NURSE PRACTITIONER

## 2023-12-19 PROCEDURE — 3079F DIAST BP 80-89 MM HG: CPT | Performed by: NURSE PRACTITIONER

## 2023-12-19 PROCEDURE — 3008F BODY MASS INDEX DOCD: CPT | Performed by: NURSE PRACTITIONER

## 2023-12-19 RX ORDER — THEOPHYLLINE 400 MG/1
400 TABLET, EXTENDED RELEASE ORAL DAILY
Qty: 30 TABLET | Refills: 11 | Status: SHIPPED | OUTPATIENT
Start: 2023-12-19 | End: 2024-04-29 | Stop reason: SDUPTHER

## 2023-12-19 RX ORDER — CETIRIZINE HYDROCHLORIDE 10 MG/1
10 TABLET ORAL DAILY
Qty: 30 TABLET | Refills: 11 | Status: SHIPPED | OUTPATIENT
Start: 2023-12-19 | End: 2024-01-02 | Stop reason: SDUPTHER

## 2023-12-19 RX ORDER — MONTELUKAST SODIUM 10 MG/1
10 TABLET ORAL NIGHTLY
Qty: 90 TABLET | Refills: 3 | Status: SHIPPED | OUTPATIENT
Start: 2023-12-19 | End: 2024-04-29 | Stop reason: SDUPTHER

## 2023-12-19 ASSESSMENT — ENCOUNTER SYMPTOMS
FEVER: 0
OCCASIONAL FEELINGS OF UNSTEADINESS: 0
COUGH: 1
CHILLS: 0
FATIGUE: 0
LOSS OF SENSATION IN FEET: 0
RHINORRHEA: 0
WHEEZING: 1
DEPRESSION: 0
SHORTNESS OF BREATH: 1
UNEXPECTED WEIGHT CHANGE: 0

## 2023-12-19 ASSESSMENT — PATIENT HEALTH QUESTIONNAIRE - PHQ9
1. LITTLE INTEREST OR PLEASURE IN DOING THINGS: NOT AT ALL
SUM OF ALL RESPONSES TO PHQ9 QUESTIONS 1 AND 2: 0
2. FEELING DOWN, DEPRESSED OR HOPELESS: NOT AT ALL

## 2023-12-19 NOTE — PROGRESS NOTES
Call regarding appt. with PCP on (12-6-23) after hospitalization.  At time of outreach call the patient feels as if their condition has (improved) since last visit.  Reviewed the PCP appointment with the pt and addressed any questions or concerns.  Tonya Alba LPN

## 2023-12-19 NOTE — PATIENT INSTRUCTIONS
Please get Chest x-ray the last week of January for follow up.   Continue on Trelegy daily.  Continue albuterol and nebulizer as needed.   Continue on Nucala.   Call me with any questions or concerns.   Follow up with me as scheduled in April.

## 2023-12-19 NOTE — PROGRESS NOTES
Subjective   Patient ID: Zohra Toledo is a 58 y.o. female who presents for asthma follow up.     HPI: Patient has PMH of HTN, factor V Leiden, provoked PE after ankle surgery, hypothyroidism, and obesity. She does have a parrot but states she has had this for four years and symptoms begun before this. She is a former smoker for 2 years only and quit 23 years ago. She has BOBO and is compliant with CPAP. She was started on a prednisone taper last week and starting to feel improvement. 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 She is with an increased productive cough. She has no other concerns.     Today she is here for follow up after being hospitalized for pneumonia 11/26-11/30. She states that breathing has continued to improve. She is compliant with her CPAP nightly again good now. She has no other concerns today.       Review of Systems   Constitutional:  Negative for chills, fatigue, fever and unexpected weight change.   HENT:  Negative for congestion, postnasal drip and rhinorrhea.    Respiratory:  Positive for cough (denies hemoptysis.), shortness of breath and wheezing.    Cardiovascular:  Negative for chest pain and leg swelling.   All other systems reviewed and are negative.      Objective   Physical Exam  Vitals reviewed.   Constitutional:       Appearance: Normal appearance.   HENT:      Head: Normocephalic.   Cardiovascular:      Rate and Rhythm: Normal rate and regular rhythm.   Pulmonary:      Effort: Pulmonary effort is normal.      Breath sounds: Decreased breath sounds present.   Skin:     General: Skin is warm and dry.   Neurological:      Mental Status: She is alert.       Assessment/Plan   1. Severe asthma  2. Severe BOBO  3. Chronic respiratory failure with hypoxia, resolved  4. Obesity        Plan:     -PFTs showing BDR, FEV1 49-52%, % and % and DLCO normal.   -6MWT done and patient no longer requires oxygen supplementation.    -Continue Trelegy 200.   -Continue albuterol and Duonebs prn.   -Continue Montelukast at HS.   -, Eos were 360. Continue on Nucala as she has noted significant benefit in overall quality of life with this.   -Continue Mucinex 600-1200 mg BID prn.  -She reports morning headaches and snoring, HST April 11, 23 with severe BOBO and has received her APAP recently which she is trying to wear but waiting on a Nasal Mask. She is also now using CPAP nightly and tolerating well and has significant improvement hypersomnia.   -Weight loss strategies. Also suspect she could have OHS.   -She is a former smoker but only for 2 years and quit 23 years ago.   -Pt has leg edema but normal cardiac work up and no hx of alcohol use or liver disease. It improves when she lays down at night and suspect it is venous insufficiency related.   -She has gained 50 lbs since her  passed away in Spring 2022 during bereavement and is motivated to lose weight.   -She was hospitalized again 11/26-11/30 for pneumonia. She is continuing to improve. Follow up CXR ordered for the end of January.     Overall I will continue with current regimen and obtain CXR. I will bring her back with me in 4 months as scheduled. I instructed patient to call sooner if needed.

## 2023-12-26 ENCOUNTER — APPOINTMENT (OUTPATIENT)
Dept: CARDIOLOGY | Facility: CLINIC | Age: 58
End: 2023-12-26
Payer: COMMERCIAL

## 2023-12-27 ENCOUNTER — APPOINTMENT (OUTPATIENT)
Dept: PRIMARY CARE | Facility: CLINIC | Age: 58
End: 2023-12-27
Payer: COMMERCIAL

## 2024-01-02 ENCOUNTER — PATIENT OUTREACH (OUTPATIENT)
Dept: CARE COORDINATION | Facility: CLINIC | Age: 59
End: 2024-01-02
Payer: COMMERCIAL

## 2024-01-02 DIAGNOSIS — J30.9 ALLERGIC RHINITIS, UNSPECIFIED SEASONALITY, UNSPECIFIED TRIGGER: ICD-10-CM

## 2024-01-02 DIAGNOSIS — J44.9 CHRONIC OBSTRUCTIVE PULMONARY DISEASE, UNSPECIFIED COPD TYPE (MULTI): ICD-10-CM

## 2024-01-02 RX ORDER — BENZONATATE 200 MG/1
200 CAPSULE ORAL 3 TIMES DAILY PRN
Qty: 30 CAPSULE | Refills: 0 | Status: SHIPPED | OUTPATIENT
Start: 2024-01-02

## 2024-01-02 RX ORDER — CETIRIZINE HYDROCHLORIDE 10 MG/1
10 TABLET ORAL DAILY
Qty: 90 TABLET | Refills: 3 | Status: SHIPPED | OUTPATIENT
Start: 2024-01-02 | End: 2024-04-29 | Stop reason: SDUPTHER

## 2024-01-02 RX ORDER — BENZONATATE 200 MG/1
200 CAPSULE ORAL 3 TIMES DAILY PRN
Qty: 20 CAPSULE | Refills: 0 | Status: SHIPPED | OUTPATIENT
Start: 2024-01-02 | End: 2024-01-02

## 2024-01-02 NOTE — PROGRESS NOTES
Unable to reach patient for one month post discharge follow up call.   LVM with call back number for patient to call if needed   If no voicemail available call attempts x 2 were made to contact the patient to assist with any questions or concerns patient may have.  Tonya Alba LPN

## 2024-01-08 ENCOUNTER — APPOINTMENT (OUTPATIENT)
Dept: PRIMARY CARE | Facility: CLINIC | Age: 59
End: 2024-01-08
Payer: COMMERCIAL

## 2024-01-22 DIAGNOSIS — I10 BENIGN HYPERTENSION: ICD-10-CM

## 2024-01-22 RX ORDER — LOSARTAN POTASSIUM 100 MG/1
100 TABLET ORAL DAILY
Qty: 90 TABLET | Refills: 0 | Status: SHIPPED | OUTPATIENT
Start: 2024-01-22 | End: 2024-04-22

## 2024-01-29 ENCOUNTER — HOSPITAL ENCOUNTER (OUTPATIENT)
Dept: RADIOLOGY | Facility: HOSPITAL | Age: 59
Discharge: HOME | End: 2024-01-29
Payer: COMMERCIAL

## 2024-01-29 DIAGNOSIS — J18.9 PNEUMONIA DUE TO INFECTIOUS ORGANISM, UNSPECIFIED LATERALITY, UNSPECIFIED PART OF LUNG: ICD-10-CM

## 2024-01-29 PROCEDURE — 71046 X-RAY EXAM CHEST 2 VIEWS: CPT

## 2024-01-29 PROCEDURE — 71046 X-RAY EXAM CHEST 2 VIEWS: CPT | Performed by: RADIOLOGY

## 2024-01-31 ENCOUNTER — TELEPHONE (OUTPATIENT)
Dept: PULMONOLOGY | Facility: HOSPITAL | Age: 59
End: 2024-01-31
Payer: COMMERCIAL

## 2024-01-31 NOTE — TELEPHONE ENCOUNTER
Patient acknowledged understanding.    ----- Message from KRISTEN Dimas sent at 1/30/2024  3:51 PM EST -----  Please call the patient and let her know that I got her CXR and pneumonia resolved and lungs are clear.

## 2024-02-01 ENCOUNTER — APPOINTMENT (OUTPATIENT)
Dept: CARDIOLOGY | Facility: CLINIC | Age: 59
End: 2024-02-01
Payer: COMMERCIAL

## 2024-02-26 ENCOUNTER — TELEPHONE (OUTPATIENT)
Dept: PRIMARY CARE | Facility: CLINIC | Age: 59
End: 2024-02-26
Payer: COMMERCIAL

## 2024-02-26 DIAGNOSIS — I10 BENIGN HYPERTENSION: ICD-10-CM

## 2024-02-26 NOTE — TELEPHONE ENCOUNTER
It looks like it was filled by the ED, can we verify this is an active med because her last admission a lot of medications for BP were changed.

## 2024-02-26 NOTE — TELEPHONE ENCOUNTER
Med Refill   carvedilol (Coreg) 25 mg tablet [[id number]]       [facility] - [place], [place] - [address] [place] [id number]  Phone: [phone]  Fax: [phone]

## 2024-02-27 NOTE — TELEPHONE ENCOUNTER
I went over med list with patient and it is correct however lorsartan was changed from  2 times daily to once daily and she states she has been on the carvedilol for over 5 years

## 2024-02-28 RX ORDER — CARVEDILOL 25 MG/1
25 TABLET ORAL
Qty: 180 TABLET | Refills: 0 | Status: SHIPPED | OUTPATIENT
Start: 2024-02-28 | End: 2024-06-05

## 2024-02-29 ENCOUNTER — PATIENT OUTREACH (OUTPATIENT)
Dept: CARE COORDINATION | Facility: CLINIC | Age: 59
End: 2024-02-29
Payer: COMMERCIAL

## 2024-02-29 NOTE — PROGRESS NOTES
Patient has met target of no readmission for (90) days post hospital discharge and is graduated from Transitional Care Management program at this time.  Tonya Alba LPN

## 2024-04-15 DIAGNOSIS — J45.50 SEVERE PERSISTENT ASTHMA WITHOUT COMPLICATION (MULTI): Primary | ICD-10-CM

## 2024-04-18 PROBLEM — J90 PLEURAL EFFUSION: Status: ACTIVE | Noted: 2023-03-22

## 2024-04-18 PROBLEM — J44.9 CHRONIC OBSTRUCTIVE PULMONARY DISEASE (MULTI): Status: ACTIVE | Noted: 2024-04-18

## 2024-04-18 PROBLEM — Z20.822 CONTACT WITH AND (SUSPECTED) EXPOSURE TO COVID-19: Status: ACTIVE | Noted: 2023-03-22

## 2024-04-18 PROBLEM — R05.1 ACUTE COUGH: Status: ACTIVE | Noted: 2023-03-01

## 2024-04-18 PROBLEM — R73.9 HYPERGLYCEMIA: Status: ACTIVE | Noted: 2023-06-19

## 2024-04-18 PROBLEM — R07.9 CHEST PAIN: Status: ACTIVE | Noted: 2024-04-18

## 2024-04-18 PROBLEM — J30.9 ALLERGIC RHINITIS: Status: ACTIVE | Noted: 2023-06-12

## 2024-04-18 PROBLEM — R60.0 EDEMA OF BOTH LOWER EXTREMITIES: Status: ACTIVE | Noted: 2024-04-18

## 2024-04-18 PROBLEM — N17.9 ACUTE KIDNEY INJURY (CMS-HCC): Status: ACTIVE | Noted: 2024-04-18

## 2024-04-19 RX ORDER — MEPOLIZUMAB 100 MG/ML
INJECTION, SOLUTION SUBCUTANEOUS
Qty: 1 ML | Refills: 13 | Status: SHIPPED | OUTPATIENT
Start: 2024-04-19

## 2024-04-22 DIAGNOSIS — I10 BENIGN HYPERTENSION: ICD-10-CM

## 2024-04-22 RX ORDER — LOSARTAN POTASSIUM 100 MG/1
100 TABLET ORAL DAILY
Qty: 90 TABLET | Refills: 3 | Status: SHIPPED | OUTPATIENT
Start: 2024-04-22

## 2024-04-29 ENCOUNTER — OFFICE VISIT (OUTPATIENT)
Dept: PULMONOLOGY | Facility: HOSPITAL | Age: 59
End: 2024-04-29
Payer: COMMERCIAL

## 2024-04-29 VITALS
TEMPERATURE: 97.8 F | RESPIRATION RATE: 16 BRPM | HEART RATE: 70 BPM | OXYGEN SATURATION: 96 % | HEIGHT: 66 IN | SYSTOLIC BLOOD PRESSURE: 136 MMHG | WEIGHT: 276.5 LBS | BODY MASS INDEX: 44.44 KG/M2 | DIASTOLIC BLOOD PRESSURE: 83 MMHG

## 2024-04-29 DIAGNOSIS — J30.9 ALLERGIC RHINITIS, UNSPECIFIED SEASONALITY, UNSPECIFIED TRIGGER: Primary | ICD-10-CM

## 2024-04-29 DIAGNOSIS — J44.9 CHRONIC OBSTRUCTIVE PULMONARY DISEASE, UNSPECIFIED COPD TYPE (MULTI): ICD-10-CM

## 2024-04-29 DIAGNOSIS — J45.51 SEVERE PERSISTENT ASTHMA WITH EXACERBATION (MULTI): ICD-10-CM

## 2024-04-29 DIAGNOSIS — J30.9 ALLERGIC RHINITIS, UNSPECIFIED SEASONALITY, UNSPECIFIED TRIGGER: ICD-10-CM

## 2024-04-29 DIAGNOSIS — G47.33 OBSTRUCTIVE SLEEP APNEA: Primary | ICD-10-CM

## 2024-04-29 PROCEDURE — 1036F TOBACCO NON-USER: CPT | Performed by: NURSE PRACTITIONER

## 2024-04-29 PROCEDURE — 99213 OFFICE O/P EST LOW 20 MIN: CPT | Performed by: NURSE PRACTITIONER

## 2024-04-29 PROCEDURE — 3008F BODY MASS INDEX DOCD: CPT | Performed by: NURSE PRACTITIONER

## 2024-04-29 PROCEDURE — 4010F ACE/ARB THERAPY RXD/TAKEN: CPT | Performed by: NURSE PRACTITIONER

## 2024-04-29 PROCEDURE — 3079F DIAST BP 80-89 MM HG: CPT | Performed by: NURSE PRACTITIONER

## 2024-04-29 PROCEDURE — 99213 OFFICE O/P EST LOW 20 MIN: CPT | Mod: ZK | Performed by: NURSE PRACTITIONER

## 2024-04-29 PROCEDURE — 3075F SYST BP GE 130 - 139MM HG: CPT | Performed by: NURSE PRACTITIONER

## 2024-04-29 RX ORDER — CETIRIZINE HYDROCHLORIDE 10 MG/1
10 TABLET ORAL DAILY
Qty: 90 TABLET | Refills: 3 | Status: SHIPPED | OUTPATIENT
Start: 2024-04-29

## 2024-04-29 RX ORDER — AZELASTINE 1 MG/ML
1 SPRAY, METERED NASAL 2 TIMES DAILY
Qty: 1 ML | Refills: 11 | Status: SHIPPED | OUTPATIENT
Start: 2024-04-29

## 2024-04-29 RX ORDER — MONTELUKAST SODIUM 10 MG/1
10 TABLET ORAL NIGHTLY
Qty: 90 TABLET | Refills: 3 | Status: SHIPPED | OUTPATIENT
Start: 2024-04-29

## 2024-04-29 RX ORDER — ALBUTEROL SULFATE 90 UG/1
2 AEROSOL, METERED RESPIRATORY (INHALATION) EVERY 4 HOURS PRN
Qty: 3 G | Refills: 3 | Status: SHIPPED | OUTPATIENT
Start: 2024-04-29

## 2024-04-29 RX ORDER — FLUTICASONE FUROATE, UMECLIDINIUM BROMIDE AND VILANTEROL TRIFENATATE 200; 62.5; 25 UG/1; UG/1; UG/1
1 POWDER RESPIRATORY (INHALATION) DAILY
Qty: 90 EACH | Refills: 3 | Status: SHIPPED | OUTPATIENT
Start: 2024-04-29

## 2024-04-29 RX ORDER — THEOPHYLLINE 400 MG/1
400 TABLET, EXTENDED RELEASE ORAL DAILY
Qty: 90 TABLET | Refills: 3 | Status: SHIPPED | OUTPATIENT
Start: 2024-04-29

## 2024-04-29 ASSESSMENT — ENCOUNTER SYMPTOMS
FEVER: 0
UNEXPECTED WEIGHT CHANGE: 0
SHORTNESS OF BREATH: 1
WHEEZING: 0
LOSS OF SENSATION IN FEET: 0
CHILLS: 0
RHINORRHEA: 0
OCCASIONAL FEELINGS OF UNSTEADINESS: 0
FATIGUE: 0
COUGH: 1
DEPRESSION: 0

## 2024-04-29 ASSESSMENT — COLUMBIA-SUICIDE SEVERITY RATING SCALE - C-SSRS
6. HAVE YOU EVER DONE ANYTHING, STARTED TO DO ANYTHING, OR PREPARED TO DO ANYTHING TO END YOUR LIFE?: NO
1. IN THE PAST MONTH, HAVE YOU WISHED YOU WERE DEAD OR WISHED YOU COULD GO TO SLEEP AND NOT WAKE UP?: NO
2. HAVE YOU ACTUALLY HAD ANY THOUGHTS OF KILLING YOURSELF?: NO

## 2024-04-29 NOTE — PATIENT INSTRUCTIONS
Continue on Trelegy daily.  Continue albuterol and nebulizer as needed.   Continue on Nucala.   Continue on Zyrtec and Montelukast.   Call me with any questions or concerns.   Follow up with me in 6 months.

## 2024-04-29 NOTE — PROGRESS NOTES
Subjective   Patient ID: Zohra Toledo is a 58 y.o. female who presents for asthma follow up.     HPI: Patient has PMH of HTN, factor V Leiden, provoked PE after ankle surgery, hypothyroidism, and obesity. She does have a parrot but states she has had this for four years and symptoms begun before this. She is a former smoker for 2 years only and quit 23 years ago. She has BOBO and is compliant with CPAP. She was started on a prednisone taper last week and starting to feel improvement. 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 She is with an increased productive cough. She has no other concerns.     Today she is here for follow up. She states that her breathing has been stable. She has not had to use albuterol often. Her congestion have been worse lately with allergies. She has no other concerns.       Review of Systems   Constitutional:  Negative for chills, fatigue, fever and unexpected weight change.   HENT:  Negative for congestion, postnasal drip and rhinorrhea.    Respiratory:  Positive for cough (denies hemoptysis.) and shortness of breath. Negative for wheezing.    Cardiovascular:  Negative for chest pain and leg swelling.   All other systems reviewed and are negative.      Objective   Physical Exam  Vitals reviewed.   Constitutional:       Appearance: Normal appearance.   HENT:      Head: Normocephalic.   Cardiovascular:      Rate and Rhythm: Normal rate and regular rhythm.   Pulmonary:      Effort: Pulmonary effort is normal.      Breath sounds: Decreased breath sounds present.   Skin:     General: Skin is warm and dry.   Neurological:      Mental Status: She is alert.       Assessment/Plan   1. Severe asthma  2. Severe BOBO  3. Chronic respiratory failure with hypoxia, resolved  4. Obesity        Plan:     -PFTs showing BDR, FEV1 49-52%, % and % and DLCO normal.   -6MWT done and patient no longer requires oxygen supplementation.   -Continue  Trelegy 200.   -Continue albuterol and Duonebs prn.   -Continue Zyrtec, Montelukast at HS, and Flonase.   -, Eos were 360. Continue on Nucala as she has noted significant benefit in overall quality of life with this.   -Continue Mucinex 600-1200 mg BID prn.  -She reports morning headaches and snoring, HST April 11, 23 with severe BOBO and has received her APAP recently which she is trying to wear but waiting on a Nasal Mask. She is also now using CPAP nightly and tolerating well and has significant improvement hypersomnia.   -Weight loss strategies. Also suspect she could have OHS.   -She is a former smoker but only for 2 years and quit 23 years ago.   -Pt has leg edema but normal cardiac work up and no hx of alcohol use or liver disease. It improves when she lays down at night and suspect it is venous insufficiency related.   -She has gained 50 lbs since her  passed away in Spring 2022 during bereavement and is motivated to lose weight.   -She was hospitalized again 11/26-11/30 for pneumonia. She is continuing to improve. CXR done in January with resolution of pneumonia.     Overall we will continue current regimen as her asthma is currently under control. I will bring her back with me in one year. I instructed patient to call sooner if needed.

## 2024-05-03 ENCOUNTER — TELEPHONE (OUTPATIENT)
Dept: PULMONOLOGY | Facility: HOSPITAL | Age: 59
End: 2024-05-03
Payer: COMMERCIAL

## 2024-05-03 RX ORDER — DOXYCYCLINE 100 MG/1
100 CAPSULE ORAL 2 TIMES DAILY
Qty: 14 CAPSULE | Refills: 0 | Status: SHIPPED | OUTPATIENT
Start: 2024-05-03 | End: 2024-05-10

## 2024-05-03 RX ORDER — PREDNISONE 10 MG/1
TABLET ORAL DAILY
Qty: 40 TABLET | Refills: 0 | Status: SHIPPED | OUTPATIENT
Start: 2024-05-03 | End: 2024-05-19

## 2024-05-03 NOTE — TELEPHONE ENCOUNTER
Patient agreeable to treatment. Patient instructed to call us if she needs anything. All questions answered at this time.    ----- Message from KRISTEN Dimas sent at 5/3/2024  9:25 AM EDT -----  I sent her a prednisone taper and Doxycycline bid x 1 week  ----- Message -----  From: Erin Akins RN  Sent: 5/3/2024   8:25 AM EDT  To: KRISTEN Dimas    Patient called in stating she wanted to let you know she is getting worse. Patient is now coughing up a lot of green/yellow mucous.

## 2024-05-08 ENCOUNTER — TELEPHONE (OUTPATIENT)
Dept: CARDIOLOGY | Facility: CLINIC | Age: 59
End: 2024-05-08
Payer: COMMERCIAL

## 2024-05-08 NOTE — TELEPHONE ENCOUNTER
5/8 11:10 am - Patient called to let us know she does not wish to continue cardiac care management with Dr. Foreman. Patient expressed that she has been attempting to be seen by Dr. Foreman for 2 years, but when each appointment rolls around we have canceled. I informed patient that we could certainly reschedule to Dr. Foreman's next available appt when he returns the week of 5/20, but patient declined this offer. Patient stated she is going to transfer to different cardiologist for continuation of care. All future appts canceled. Dr. Foreman team notified.     - Jorge BARNES

## 2024-05-14 ENCOUNTER — APPOINTMENT (OUTPATIENT)
Dept: CARDIOLOGY | Facility: CLINIC | Age: 59
End: 2024-05-14
Payer: COMMERCIAL

## 2024-05-16 ENCOUNTER — TELEPHONE (OUTPATIENT)
Dept: RESPIRATORY THERAPY | Facility: HOSPITAL | Age: 59
End: 2024-05-16
Payer: COMMERCIAL

## 2024-05-16 DIAGNOSIS — E11.9 TYPE 2 DIABETES MELLITUS WITHOUT COMPLICATION, WITHOUT LONG-TERM CURRENT USE OF INSULIN (MULTI): ICD-10-CM

## 2024-05-16 DIAGNOSIS — J45.51 SEVERE PERSISTENT ASTHMA WITH EXACERBATION (MULTI): Primary | ICD-10-CM

## 2024-05-16 RX ORDER — CEFDINIR 300 MG/1
300 CAPSULE ORAL 2 TIMES DAILY
Qty: 14 CAPSULE | Refills: 0 | Status: SHIPPED | OUTPATIENT
Start: 2024-05-16 | End: 2024-05-23

## 2024-05-16 NOTE — TELEPHONE ENCOUNTER
Called and spoke with patient and let her know we sent her another antibiotic and advised her to get the CXR as soon as she could. Instructed her to call us back with any further questions or concerns. Patient was agreeable to treatment plan and acknowledged understanding.     ----- Message from KRISTEN Dimas sent at 5/16/2024  3:00 PM EDT -----  Regarding: RE: Cough  I sent her Cefdinir BID x 1 week and ordered a CXR.  ----- Message -----  From: Maribel Larkin MA  Sent: 5/16/2024  12:58 PM EDT  To: KRISTEN Dimas  Subject: Cough                                            Patient called in stating her cough is not any better, she has finished the antibiotics and has 2 days of prednisone left.

## 2024-05-20 ENCOUNTER — HOSPITAL ENCOUNTER (OUTPATIENT)
Dept: RADIOLOGY | Facility: HOSPITAL | Age: 59
Discharge: HOME | End: 2024-05-20
Payer: COMMERCIAL

## 2024-05-20 DIAGNOSIS — J45.51 SEVERE PERSISTENT ASTHMA WITH EXACERBATION (MULTI): ICD-10-CM

## 2024-05-20 PROCEDURE — 71046 X-RAY EXAM CHEST 2 VIEWS: CPT | Performed by: RADIOLOGY

## 2024-05-20 PROCEDURE — 71046 X-RAY EXAM CHEST 2 VIEWS: CPT

## 2024-05-20 RX ORDER — METFORMIN HYDROCHLORIDE 500 MG/1
500 TABLET ORAL
Qty: 180 TABLET | Refills: 3 | Status: SHIPPED | OUTPATIENT
Start: 2024-05-20

## 2024-05-22 ENCOUNTER — TELEPHONE (OUTPATIENT)
Dept: PULMONOLOGY | Facility: HOSPITAL | Age: 59
End: 2024-05-22
Payer: COMMERCIAL

## 2024-05-22 NOTE — TELEPHONE ENCOUNTER
Patient is agreeable to this. Patient acknowledged understanding. All questions answered at this time.     ----- Message from KRISTEN Dimas sent at 5/22/2024  9:47 AM EDT -----  Unfortunately with any worsening she will need to seek care in ER, I have given her everything I can as an outpatient.   ----- Message -----  From: Erin Akins RN  Sent: 5/22/2024   9:19 AM EDT  To: KRISTEN Dimas    Patient states she is starting to feel better. Patient is still coughing a lot! She states this is not better and she is still coughing up yellow mucous. Hasn't been able to sleep with her PAP due to cough.  ----- Message -----  From: KRISTEN Dimas  Sent: 5/21/2024   9:04 AM EDT  To: Erin Akins RN    Please call the patient and let her know that her lungs are clear on CXR can you ask her how she is feeling?

## 2024-05-31 ENCOUNTER — TELEPHONE (OUTPATIENT)
Dept: PRIMARY CARE | Facility: CLINIC | Age: 59
End: 2024-05-31
Payer: COMMERCIAL

## 2024-05-31 DIAGNOSIS — E03.9 HYPOTHYROIDISM, UNSPECIFIED TYPE: ICD-10-CM

## 2024-05-31 RX ORDER — LEVOTHYROXINE SODIUM 100 UG/1
100 TABLET ORAL DAILY
Qty: 90 TABLET | Refills: 3 | Status: SHIPPED | OUTPATIENT
Start: 2024-05-31 | End: 2025-05-31

## 2024-05-31 NOTE — TELEPHONE ENCOUNTER
[facility] Med Refill   Levothyroxine- not in med list please advise     [facility] - [place], [place] - [address] [place] [id number]  Phone: [phone]  Fax: [phone]     LOV: 12/06/23- Dr. Hubbard     NOV: 8/19/24 - patient establishing

## 2024-06-04 DIAGNOSIS — I10 BENIGN HYPERTENSION: ICD-10-CM

## 2024-06-05 RX ORDER — CARVEDILOL 25 MG/1
25 TABLET ORAL DAILY
Qty: 90 TABLET | Refills: 1 | Status: SHIPPED | OUTPATIENT
Start: 2024-06-05

## 2024-06-20 ENCOUNTER — TELEPHONE (OUTPATIENT)
Dept: PRIMARY CARE | Facility: CLINIC | Age: 59
End: 2024-06-20
Payer: COMMERCIAL

## 2024-06-20 DIAGNOSIS — I10 BENIGN HYPERTENSION: ICD-10-CM

## 2024-06-20 RX ORDER — CARVEDILOL 25 MG/1
25 TABLET ORAL 2 TIMES DAILY
Qty: 180 TABLET | Refills: 3 | Status: SHIPPED | OUTPATIENT
Start: 2024-06-20 | End: 2025-06-15

## 2024-06-20 NOTE — TELEPHONE ENCOUNTER
Looks like that is how it was entered in our system. If we can confirm with Pharmacy, can pend me new Rx. Thank you!

## 2024-08-19 ENCOUNTER — APPOINTMENT (OUTPATIENT)
Dept: PRIMARY CARE | Facility: CLINIC | Age: 59
End: 2024-08-19
Payer: COMMERCIAL

## 2024-08-19 VITALS
HEIGHT: 66 IN | SYSTOLIC BLOOD PRESSURE: 140 MMHG | WEIGHT: 270 LBS | DIASTOLIC BLOOD PRESSURE: 80 MMHG | BODY MASS INDEX: 43.39 KG/M2 | HEART RATE: 52 BPM

## 2024-08-19 DIAGNOSIS — Z12.31 VISIT FOR SCREENING MAMMOGRAM: Primary | ICD-10-CM

## 2024-08-19 DIAGNOSIS — I10 BENIGN HYPERTENSION: ICD-10-CM

## 2024-08-19 DIAGNOSIS — E03.9 HYPOTHYROIDISM, UNSPECIFIED TYPE: ICD-10-CM

## 2024-08-19 DIAGNOSIS — E04.9 ENLARGED THYROID: ICD-10-CM

## 2024-08-19 DIAGNOSIS — J96.11 CHRONIC RESPIRATORY FAILURE WITH HYPOXIA (MULTI): ICD-10-CM

## 2024-08-19 DIAGNOSIS — G47.33 OSA (OBSTRUCTIVE SLEEP APNEA): Chronic | ICD-10-CM

## 2024-08-19 DIAGNOSIS — E11.9 TYPE 2 DIABETES MELLITUS WITHOUT COMPLICATION, WITHOUT LONG-TERM CURRENT USE OF INSULIN (MULTI): ICD-10-CM

## 2024-08-19 DIAGNOSIS — Z76.89 ENCOUNTER TO ESTABLISH CARE: ICD-10-CM

## 2024-08-19 DIAGNOSIS — E66.01 CLASS 3 SEVERE OBESITY DUE TO EXCESS CALORIES WITH SERIOUS COMORBIDITY AND BODY MASS INDEX (BMI) OF 45.0 TO 49.9 IN ADULT (MULTI): ICD-10-CM

## 2024-08-19 DIAGNOSIS — J42 CHRONIC BRONCHITIS, UNSPECIFIED CHRONIC BRONCHITIS TYPE (MULTI): ICD-10-CM

## 2024-08-19 PROBLEM — Z20.822 CONTACT WITH AND (SUSPECTED) EXPOSURE TO COVID-19: Status: RESOLVED | Noted: 2023-03-22 | Resolved: 2024-08-19

## 2024-08-19 PROBLEM — J18.9 PNEUMONIA OF LEFT LOWER LOBE DUE TO INFECTIOUS ORGANISM: Status: RESOLVED | Noted: 2023-11-26 | Resolved: 2024-08-19

## 2024-08-19 PROBLEM — J45.901 SEVERE ASTHMA WITH EXACERBATION (HHS-HCC): Status: RESOLVED | Noted: 2023-05-30 | Resolved: 2024-08-19

## 2024-08-19 PROBLEM — R05.1 ACUTE COUGH: Status: RESOLVED | Noted: 2023-03-01 | Resolved: 2024-08-19

## 2024-08-19 PROBLEM — R60.9 EDEMA: Status: RESOLVED | Noted: 2023-05-30 | Resolved: 2024-08-19

## 2024-08-19 PROBLEM — N17.9 ACUTE KIDNEY INJURY (CMS-HCC): Status: RESOLVED | Noted: 2024-04-18 | Resolved: 2024-08-19

## 2024-08-19 PROBLEM — L03.90 CELLULITIS: Status: RESOLVED | Noted: 2023-07-19 | Resolved: 2024-08-19

## 2024-08-19 PROBLEM — R07.9 CHEST PAIN: Status: RESOLVED | Noted: 2024-04-18 | Resolved: 2024-08-19

## 2024-08-19 PROBLEM — R73.9 HYPERGLYCEMIA: Status: RESOLVED | Noted: 2023-06-19 | Resolved: 2024-08-19

## 2024-08-19 PROCEDURE — 90471 IMMUNIZATION ADMIN: CPT | Performed by: CLINICAL NURSE SPECIALIST

## 2024-08-19 PROCEDURE — 90715 TDAP VACCINE 7 YRS/> IM: CPT | Performed by: CLINICAL NURSE SPECIALIST

## 2024-08-19 PROCEDURE — 1036F TOBACCO NON-USER: CPT | Performed by: CLINICAL NURSE SPECIALIST

## 2024-08-19 PROCEDURE — 3079F DIAST BP 80-89 MM HG: CPT | Performed by: CLINICAL NURSE SPECIALIST

## 2024-08-19 PROCEDURE — 3008F BODY MASS INDEX DOCD: CPT | Performed by: CLINICAL NURSE SPECIALIST

## 2024-08-19 PROCEDURE — 4010F ACE/ARB THERAPY RXD/TAKEN: CPT | Performed by: CLINICAL NURSE SPECIALIST

## 2024-08-19 PROCEDURE — 99214 OFFICE O/P EST MOD 30 MIN: CPT | Performed by: CLINICAL NURSE SPECIALIST

## 2024-08-19 PROCEDURE — 3077F SYST BP >= 140 MM HG: CPT | Performed by: CLINICAL NURSE SPECIALIST

## 2024-08-19 ASSESSMENT — ENCOUNTER SYMPTOMS
PALPITATIONS: 0
FEVER: 0
FLANK PAIN: 0
HEADACHES: 0
CONSTIPATION: 0
NECK PAIN: 0
TROUBLE SWALLOWING: 0
DIZZINESS: 0
HEMATURIA: 0
JOINT SWELLING: 0
UNEXPECTED WEIGHT CHANGE: 0
CONFUSION: 0
SEIZURES: 0
COUGH: 0
POLYDIPSIA: 0
BACK PAIN: 0
ABDOMINAL PAIN: 0
EYE PAIN: 0
NAUSEA: 0
LOSS OF SENSATION IN FEET: 0
SHORTNESS OF BREATH: 0
PHOTOPHOBIA: 0
CHEST TIGHTNESS: 0
CHILLS: 0
FATIGUE: 0
APPETITE CHANGE: 0
BRUISES/BLEEDS EASILY: 0
BLOOD IN STOOL: 0
DIARRHEA: 0
OCCASIONAL FEELINGS OF UNSTEADINESS: 0
SORE THROAT: 0
ARTHRALGIAS: 0
WOUND: 0
ACTIVITY CHANGE: 0
DYSURIA: 0
WHEEZING: 0
DEPRESSION: 0
SLEEP DISTURBANCE: 0
VOMITING: 0
MYALGIAS: 0

## 2024-08-19 NOTE — PROGRESS NOTES
Subjective   Patient ID: Zohra Toledo is a 59 y.o. female who presents for Establish Care (Transfer from Dr. Hubbard /Discuss thyroid ).  HPI    New patient here today to establish care.  Transfer care from Dr. Hubbard.     Diabetes, due for updated lab work. Up to date with [facility] exams. Has been monitoring her blood sugars at home, well controlled. No longer taking her Metformin.     Was told that she potentially had a Goiter on her Thyroid, recommended following with PCP.     Review of Systems   Constitutional:  Negative for activity change, appetite change, chills, fatigue, fever and unexpected weight change.   HENT:  Negative for ear pain, hearing loss, nosebleeds, sore throat, tinnitus and trouble swallowing.    Eyes:  Negative for photophobia, pain and visual disturbance.   Respiratory:  Negative for cough, chest tightness, shortness of breath and wheezing.    Cardiovascular:  Negative for chest pain, palpitations and leg swelling.   Gastrointestinal:  Negative for abdominal pain, blood in stool, constipation, diarrhea, nausea and vomiting.   Endocrine: Negative for cold intolerance, heat intolerance, polydipsia and polyuria.   Genitourinary:  Negative for dysuria, flank pain and hematuria.   Musculoskeletal:  Negative for arthralgias, back pain, joint swelling, myalgias and neck pain.   Skin:  Negative for pallor, rash and wound.   Allergic/Immunologic: Negative for immunocompromised state.   Neurological:  Negative for dizziness, seizures and headaches.   Hematological:  Does not bruise/bleed easily.   Psychiatric/Behavioral:  Negative for confusion and sleep disturbance.        Objective   Physical Exam  Constitutional:       General: She is not in acute distress.     Appearance: Normal appearance.   HENT:      Head: Normocephalic.      Nose: Nose normal.   Eyes:      Conjunctiva/sclera: Conjunctivae normal.   Neck:      Vascular: No carotid bruit.   Cardiovascular:      Rate and Rhythm: Normal rate and  regular rhythm.      Pulses: Normal pulses.      Heart sounds: Normal heart sounds.   Pulmonary:      Effort: Pulmonary effort is normal.      Breath sounds: Normal breath sounds.   Abdominal:      General: Bowel sounds are normal.      Palpations: Abdomen is soft.   Musculoskeletal:         General: Normal range of motion.      Cervical back: Normal range of motion.   Skin:     General: Skin is warm and dry.   Neurological:      Mental Status: She is alert and oriented to person, place, and time. Mental status is at baseline.   Psychiatric:         Mood and Affect: Mood normal.         Behavior: Behavior normal.       Assessment/Plan       New order for blood work provided at [facility] today.     Hypertension: Blood pressure borderline controlled at [facility] today. Encourage ambulatory blood pressure monitoring. Notify office with elevated readings.   Hypothyroidism, Enlarged Thyroid: Updated lab work ordered. Continue Levothyroxine. US ordered.   Diabetes: A1C 6.9% on last blood work. Updated lab work ordered. Albumin ordered. Discussed monitoring of blood sugar and goals of care. CT Cardiac Scoring ordered.   Chronic Respiratory Failure, COPD: Follows with Pulmonology for management. Respiratory status at baseline.   Obesity: BMI: 43.58. Lifestyle changes recommended: Diet consisting of low fat foods, lean meats, high fiber, fresh fruits and vegetables. 150 min/ weekly aerobic exercise.   Sleep Apnea: continue to use Pap therapy as indicated.      Cologuard: June 2023, negative.   Mammogram: June 2023. Ordered for 2024.    Shingrix: May/June 2023.   Tdap: August 2024.     ADITI Berry-CNS 08/19/24 12:13 PM

## 2024-08-21 ENCOUNTER — TELEPHONE (OUTPATIENT)
Dept: PRIMARY CARE | Facility: CLINIC | Age: 59
End: 2024-08-21

## 2024-08-21 ENCOUNTER — HOSPITAL ENCOUNTER (OUTPATIENT)
Dept: RADIOLOGY | Facility: HOSPITAL | Age: 59
Discharge: HOME | End: 2024-08-21
Payer: COMMERCIAL

## 2024-08-21 ENCOUNTER — LAB (OUTPATIENT)
Dept: LAB | Facility: LAB | Age: 59
End: 2024-08-21
Payer: COMMERCIAL

## 2024-08-21 DIAGNOSIS — E11.9 TYPE 2 DIABETES MELLITUS WITHOUT COMPLICATION, WITHOUT LONG-TERM CURRENT USE OF INSULIN (MULTI): ICD-10-CM

## 2024-08-21 DIAGNOSIS — E03.9 HYPOTHYROIDISM, UNSPECIFIED TYPE: ICD-10-CM

## 2024-08-21 DIAGNOSIS — Z12.31 VISIT FOR SCREENING MAMMOGRAM: ICD-10-CM

## 2024-08-21 DIAGNOSIS — I10 BENIGN HYPERTENSION: ICD-10-CM

## 2024-08-21 DIAGNOSIS — E04.9 ENLARGED THYROID: ICD-10-CM

## 2024-08-21 LAB
25(OH)D3 SERPL-MCNC: 37 NG/ML (ref 30–100)
ALBUMIN SERPL BCP-MCNC: 3.9 G/DL (ref 3.4–5)
ALP SERPL-CCNC: 43 U/L (ref 33–110)
ALT SERPL W P-5'-P-CCNC: 32 U/L (ref 7–45)
ANION GAP SERPL CALC-SCNC: 10 MMOL/L (ref 10–20)
AST SERPL W P-5'-P-CCNC: 22 U/L (ref 9–39)
BILIRUB SERPL-MCNC: 0.6 MG/DL (ref 0–1.2)
BUN SERPL-MCNC: 26 MG/DL (ref 6–23)
CALCIUM SERPL-MCNC: 9.2 MG/DL (ref 8.6–10.3)
CHLORIDE SERPL-SCNC: 104 MMOL/L (ref 98–107)
CHOLEST SERPL-MCNC: 132 MG/DL (ref 0–199)
CHOLESTEROL/HDL RATIO: 3.5
CO2 SERPL-SCNC: 30 MMOL/L (ref 21–32)
CREAT SERPL-MCNC: 1 MG/DL (ref 0.5–1.05)
CREAT UR-MCNC: 85.9 MG/DL (ref 20–320)
EGFRCR SERPLBLD CKD-EPI 2021: 65 ML/MIN/1.73M*2
ERYTHROCYTE [DISTWIDTH] IN BLOOD BY AUTOMATED COUNT: 14.9 % (ref 11.5–14.5)
EST. AVERAGE GLUCOSE BLD GHB EST-MCNC: 140 MG/DL
GLUCOSE SERPL-MCNC: 109 MG/DL (ref 74–99)
HBA1C MFR BLD: 6.5 %
HCT VFR BLD AUTO: 41.7 % (ref 36–46)
HCV AB SER QL: NONREACTIVE
HDLC SERPL-MCNC: 37.3 MG/DL
HGB BLD-MCNC: 13.4 G/DL (ref 12–16)
LDLC SERPL CALC-MCNC: 65 MG/DL
MCH RBC QN AUTO: 27.8 PG (ref 26–34)
MCHC RBC AUTO-ENTMCNC: 32.1 G/DL (ref 32–36)
MCV RBC AUTO: 87 FL (ref 80–100)
MICROALBUMIN UR-MCNC: 16.7 MG/L
MICROALBUMIN/CREAT UR: 19.4 UG/MG CREAT
NON HDL CHOLESTEROL: 95 MG/DL (ref 0–149)
NRBC BLD-RTO: 0 /100 WBCS (ref 0–0)
PLATELET # BLD AUTO: 235 X10*3/UL (ref 150–450)
POTASSIUM SERPL-SCNC: 4.4 MMOL/L (ref 3.5–5.3)
PROT SERPL-MCNC: 6.4 G/DL (ref 6.4–8.2)
RBC # BLD AUTO: 4.82 X10*6/UL (ref 4–5.2)
SODIUM SERPL-SCNC: 140 MMOL/L (ref 136–145)
T4 FREE SERPL-MCNC: 1.22 NG/DL (ref 0.61–1.12)
TRIGL SERPL-MCNC: 148 MG/DL (ref 0–149)
TSH SERPL-ACNC: 4.32 MIU/L (ref 0.44–3.98)
VIT B12 SERPL-MCNC: 528 PG/ML (ref 211–911)
VLDL: 30 MG/DL (ref 0–40)
WBC # BLD AUTO: 6.1 X10*3/UL (ref 4.4–11.3)

## 2024-08-21 PROCEDURE — 86803 HEPATITIS C AB TEST: CPT

## 2024-08-21 PROCEDURE — 82607 VITAMIN B-12: CPT

## 2024-08-21 PROCEDURE — 80053 COMPREHEN METABOLIC PANEL: CPT

## 2024-08-21 PROCEDURE — 82043 UR ALBUMIN QUANTITATIVE: CPT

## 2024-08-21 PROCEDURE — 76536 US EXAM OF HEAD AND NECK: CPT

## 2024-08-21 PROCEDURE — 80061 LIPID PANEL: CPT

## 2024-08-21 PROCEDURE — 85027 COMPLETE CBC AUTOMATED: CPT

## 2024-08-21 PROCEDURE — 84443 ASSAY THYROID STIM HORMONE: CPT

## 2024-08-21 PROCEDURE — 84439 ASSAY OF FREE THYROXINE: CPT

## 2024-08-21 PROCEDURE — 82306 VITAMIN D 25 HYDROXY: CPT

## 2024-08-21 PROCEDURE — 76536 US EXAM OF HEAD AND NECK: CPT | Performed by: RADIOLOGY

## 2024-08-21 PROCEDURE — 36415 COLL VENOUS BLD VENIPUNCTURE: CPT

## 2024-08-21 PROCEDURE — 83036 HEMOGLOBIN GLYCOSYLATED A1C: CPT

## 2024-08-21 PROCEDURE — 82570 ASSAY OF URINE CREATININE: CPT

## 2024-08-21 RX ORDER — LEVOTHYROXINE SODIUM 112 UG/1
112 TABLET ORAL DAILY
Qty: 90 TABLET | Refills: 3 | Status: SHIPPED | OUTPATIENT
Start: 2024-08-21 | End: 2025-08-21

## 2024-08-21 NOTE — TELEPHONE ENCOUNTER
----- Message from Lori Sarmiento sent at 8/21/2024  3:53 PM EDT -----  Please call patient with lab results. Thyroid is uncontrolled. Would like to increase dosage of medication and keep appointment to have ultrasound completed. Medication E-prescribed. Plan to repeat Thyroid panel prior to visit in November. Please place a new lab order. Remaining blood work is stable. No other changes needed at this time. Thank you!

## 2024-08-22 ENCOUNTER — HOSPITAL ENCOUNTER (OUTPATIENT)
Dept: RADIOLOGY | Facility: HOSPITAL | Age: 59
Discharge: HOME | End: 2024-08-22
Payer: COMMERCIAL

## 2024-08-22 VITALS — BODY MASS INDEX: 42.75 KG/M2 | HEIGHT: 66 IN | WEIGHT: 266 LBS

## 2024-08-22 DIAGNOSIS — Z12.31 VISIT FOR SCREENING MAMMOGRAM: ICD-10-CM

## 2024-08-22 PROCEDURE — 77067 SCR MAMMO BI INCL CAD: CPT | Performed by: RADIOLOGY

## 2024-08-22 PROCEDURE — 77063 BREAST TOMOSYNTHESIS BI: CPT | Performed by: RADIOLOGY

## 2024-08-22 PROCEDURE — 77067 SCR MAMMO BI INCL CAD: CPT

## 2024-10-28 ENCOUNTER — APPOINTMENT (OUTPATIENT)
Dept: PULMONOLOGY | Facility: HOSPITAL | Age: 59
End: 2024-10-28
Payer: COMMERCIAL

## 2024-10-28 VITALS
RESPIRATION RATE: 16 BRPM | TEMPERATURE: 98 F | BODY MASS INDEX: 41.78 KG/M2 | HEIGHT: 66 IN | DIASTOLIC BLOOD PRESSURE: 80 MMHG | SYSTOLIC BLOOD PRESSURE: 148 MMHG | WEIGHT: 260 LBS | OXYGEN SATURATION: 95 %

## 2024-10-28 DIAGNOSIS — J30.9 ALLERGIC RHINITIS, UNSPECIFIED SEASONALITY, UNSPECIFIED TRIGGER: ICD-10-CM

## 2024-10-28 DIAGNOSIS — G47.33 OBSTRUCTIVE SLEEP APNEA: ICD-10-CM

## 2024-10-28 DIAGNOSIS — J45.51 SEVERE PERSISTENT ASTHMA WITH EXACERBATION (MULTI): Primary | ICD-10-CM

## 2024-10-28 DIAGNOSIS — J44.9 CHRONIC OBSTRUCTIVE PULMONARY DISEASE, UNSPECIFIED COPD TYPE (MULTI): ICD-10-CM

## 2024-10-28 PROCEDURE — 3044F HG A1C LEVEL LT 7.0%: CPT | Performed by: NURSE PRACTITIONER

## 2024-10-28 PROCEDURE — 3061F NEG MICROALBUMINURIA REV: CPT | Performed by: NURSE PRACTITIONER

## 2024-10-28 PROCEDURE — 3048F LDL-C <100 MG/DL: CPT | Performed by: NURSE PRACTITIONER

## 2024-10-28 PROCEDURE — 3008F BODY MASS INDEX DOCD: CPT | Performed by: NURSE PRACTITIONER

## 2024-10-28 PROCEDURE — 3079F DIAST BP 80-89 MM HG: CPT | Performed by: NURSE PRACTITIONER

## 2024-10-28 PROCEDURE — 99213 OFFICE O/P EST LOW 20 MIN: CPT | Performed by: NURSE PRACTITIONER

## 2024-10-28 PROCEDURE — 4010F ACE/ARB THERAPY RXD/TAKEN: CPT | Performed by: NURSE PRACTITIONER

## 2024-10-28 PROCEDURE — 3077F SYST BP >= 140 MM HG: CPT | Performed by: NURSE PRACTITIONER

## 2024-10-28 PROCEDURE — 1036F TOBACCO NON-USER: CPT | Performed by: NURSE PRACTITIONER

## 2024-10-28 ASSESSMENT — ENCOUNTER SYMPTOMS
SHORTNESS OF BREATH: 1
OCCASIONAL FEELINGS OF UNSTEADINESS: 0
FEVER: 0
LOSS OF SENSATION IN FEET: 0
COUGH: 1
CHILLS: 0
FATIGUE: 0
RHINORRHEA: 0
WHEEZING: 0
DEPRESSION: 0
UNEXPECTED WEIGHT CHANGE: 0

## 2024-11-01 DIAGNOSIS — J44.9 CHRONIC OBSTRUCTIVE PULMONARY DISEASE, UNSPECIFIED COPD TYPE (MULTI): ICD-10-CM

## 2024-11-01 DIAGNOSIS — J30.9 ALLERGIC RHINITIS, UNSPECIFIED SEASONALITY, UNSPECIFIED TRIGGER: ICD-10-CM

## 2024-11-01 RX ORDER — CETIRIZINE HYDROCHLORIDE 10 MG/1
10 TABLET ORAL DAILY
Qty: 90 TABLET | Refills: 3 | Status: SHIPPED | OUTPATIENT
Start: 2024-11-01

## 2024-11-01 RX ORDER — BENZONATATE 200 MG/1
200 CAPSULE ORAL 3 TIMES DAILY PRN
Qty: 90 CAPSULE | Refills: 0 | Status: SHIPPED | OUTPATIENT
Start: 2024-11-01

## 2024-11-05 ENCOUNTER — TELEPHONE (OUTPATIENT)
Dept: PULMONOLOGY | Facility: HOSPITAL | Age: 59
End: 2024-11-05
Payer: COMMERCIAL

## 2024-11-05 DIAGNOSIS — J45.51 SEVERE PERSISTENT ASTHMA WITH EXACERBATION (MULTI): Primary | ICD-10-CM

## 2024-11-05 DIAGNOSIS — G47.33 OBSTRUCTIVE SLEEP APNEA: ICD-10-CM

## 2024-11-05 NOTE — TELEPHONE ENCOUNTER
Patient acknowledged understanding. All questions answered at this time. All new orders sent to [facility].    ----- Message from Karma Alba sent at 11/5/2024  1:18 PM EST -----  Dr. Shook reviewed data and had me decrease her pressures to 4-10, have her notify us if she still has issues with it and cannot tolerate it.  ----- Message -----  From: Erin Akins RN  Sent: 11/4/2024  12:03 PM EST  To: KRISTEN Dimas      ----- Message -----  From: KRISTEN Dimas  Sent: 11/4/2024  11:57 AM EST  To: Erin Akins RN    I need the download  ----- Message -----  From: Erin Akins RN  Sent: 11/4/2024  11:50 AM EST  To: KRISTEN Dimas    Patient called in stating that philipp states her machine Is fine. She states the pressure is crazy. She is wondering if there is anyway we could turn down the pressure a little.

## 2024-11-13 ENCOUNTER — LAB (OUTPATIENT)
Dept: LAB | Facility: LAB | Age: 59
End: 2024-11-13
Payer: COMMERCIAL

## 2024-11-13 DIAGNOSIS — E03.9 HYPOTHYROIDISM, UNSPECIFIED TYPE: ICD-10-CM

## 2024-11-13 LAB — TSH SERPL-ACNC: 1.77 MIU/L (ref 0.44–3.98)

## 2024-11-13 PROCEDURE — 36415 COLL VENOUS BLD VENIPUNCTURE: CPT

## 2024-11-13 PROCEDURE — 84443 ASSAY THYROID STIM HORMONE: CPT

## 2024-11-18 DIAGNOSIS — J44.9 CHRONIC OBSTRUCTIVE PULMONARY DISEASE, UNSPECIFIED COPD TYPE (MULTI): ICD-10-CM

## 2024-11-18 RX ORDER — BENZONATATE 200 MG/1
200 CAPSULE ORAL 3 TIMES DAILY PRN
Qty: 90 CAPSULE | Refills: 0 | Status: SHIPPED | OUTPATIENT
Start: 2024-11-18

## 2024-11-20 ENCOUNTER — APPOINTMENT (OUTPATIENT)
Dept: PRIMARY CARE | Facility: CLINIC | Age: 59
End: 2024-11-20
Payer: COMMERCIAL

## 2024-11-20 ENCOUNTER — TELEPHONE (OUTPATIENT)
Dept: PRIMARY CARE | Facility: CLINIC | Age: 59
End: 2024-11-20

## 2024-11-20 VITALS
WEIGHT: 267 LBS | DIASTOLIC BLOOD PRESSURE: 68 MMHG | BODY MASS INDEX: 42.91 KG/M2 | HEIGHT: 66 IN | HEART RATE: 63 BPM | SYSTOLIC BLOOD PRESSURE: 122 MMHG

## 2024-11-20 DIAGNOSIS — E11.9 TYPE 2 DIABETES MELLITUS WITHOUT COMPLICATION, WITHOUT LONG-TERM CURRENT USE OF INSULIN (MULTI): ICD-10-CM

## 2024-11-20 DIAGNOSIS — E04.9 ENLARGED THYROID: ICD-10-CM

## 2024-11-20 DIAGNOSIS — E03.9 HYPOTHYROIDISM, UNSPECIFIED TYPE: ICD-10-CM

## 2024-11-20 DIAGNOSIS — Z12.31 VISIT FOR SCREENING MAMMOGRAM: ICD-10-CM

## 2024-11-20 DIAGNOSIS — I10 BENIGN HYPERTENSION: ICD-10-CM

## 2024-11-20 PROBLEM — D68.51 ACTIVATED PROTEIN C RESISTANCE (MULTI): Status: RESOLVED | Noted: 2023-12-06 | Resolved: 2024-11-20

## 2024-11-20 PROCEDURE — 90746 HEPB VACCINE 3 DOSE ADULT IM: CPT | Performed by: CLINICAL NURSE SPECIALIST

## 2024-11-20 PROCEDURE — 3048F LDL-C <100 MG/DL: CPT | Performed by: CLINICAL NURSE SPECIALIST

## 2024-11-20 PROCEDURE — 4010F ACE/ARB THERAPY RXD/TAKEN: CPT | Performed by: CLINICAL NURSE SPECIALIST

## 2024-11-20 PROCEDURE — 1036F TOBACCO NON-USER: CPT | Performed by: CLINICAL NURSE SPECIALIST

## 2024-11-20 PROCEDURE — 3074F SYST BP LT 130 MM HG: CPT | Performed by: CLINICAL NURSE SPECIALIST

## 2024-11-20 PROCEDURE — 3044F HG A1C LEVEL LT 7.0%: CPT | Performed by: CLINICAL NURSE SPECIALIST

## 2024-11-20 PROCEDURE — 90471 IMMUNIZATION ADMIN: CPT | Performed by: CLINICAL NURSE SPECIALIST

## 2024-11-20 PROCEDURE — 3008F BODY MASS INDEX DOCD: CPT | Performed by: CLINICAL NURSE SPECIALIST

## 2024-11-20 PROCEDURE — 3061F NEG MICROALBUMINURIA REV: CPT | Performed by: CLINICAL NURSE SPECIALIST

## 2024-11-20 PROCEDURE — 3078F DIAST BP <80 MM HG: CPT | Performed by: CLINICAL NURSE SPECIALIST

## 2024-11-20 PROCEDURE — 99214 OFFICE O/P EST MOD 30 MIN: CPT | Performed by: CLINICAL NURSE SPECIALIST

## 2024-11-20 RX ORDER — LOSARTAN POTASSIUM 100 MG/1
100 TABLET ORAL DAILY
Qty: 90 TABLET | Refills: 3 | Status: SHIPPED | OUTPATIENT
Start: 2024-11-20

## 2024-11-20 RX ORDER — CARVEDILOL 25 MG/1
25 TABLET ORAL 2 TIMES DAILY
Qty: 180 TABLET | Refills: 3 | Status: SHIPPED | OUTPATIENT
Start: 2024-11-20 | End: 2025-11-15

## 2024-11-20 ASSESSMENT — ENCOUNTER SYMPTOMS
LOSS OF SENSATION IN FEET: 0
OCCASIONAL FEELINGS OF UNSTEADINESS: 0
DEPRESSION: 0

## 2024-11-20 ASSESSMENT — COLUMBIA-SUICIDE SEVERITY RATING SCALE - C-SSRS
1. IN THE PAST MONTH, HAVE YOU WISHED YOU WERE DEAD OR WISHED YOU COULD GO TO SLEEP AND NOT WAKE UP?: NO
2. HAVE YOU ACTUALLY HAD ANY THOUGHTS OF KILLING YOURSELF?: NO
6. HAVE YOU EVER DONE ANYTHING, STARTED TO DO ANYTHING, OR PREPARED TO DO ANYTHING TO END YOUR LIFE?: NO

## 2024-11-20 NOTE — PROGRESS NOTES
Subjective   Patient ID: Zohra Toledo is a 59 y.o. female who presents for Follow-up (Follow up/Discuss great big toe X's months ).  HPI    Here today as a follow up appointment.     Increased pain in left foot, great toe. Painful to touch. Even the sheet uncomfortable. Pins and needles sensation. Has not tried anything OTC.      Diabetes, due for updated lab work. Up to date with [facility] exams. Has been monitoring her blood sugars at home, well controlled. No longer taking her Metformin.      Was told that she potentially had a Goiter on her Thyroid, recommended following with PCP.     Review of Systems    Objective   Physical Exam    Assessment/Plan        New order for blood work provided at [facility] today.      Hypertension: Blood pressure better controlled at OV today. Encourage ambulatory blood pressure monitoring. Notify office with elevated readings.   Hypothyroidism, Enlarged Thyroid: Updated lab work ordered. Continue Levothyroxine. US completed.   Diabetes: A1C 6.5% on last blood work. Albumin: August 2024. Discussed monitoring of blood sugar and goals of care. CT Cardiac Scoring: Scheduled for December 2024.  Did not tolerate Metformin. Ozempic as prescribed.   Chronic Respiratory Failure, COPD: Follows with Pulmonology for management. Respiratory status at baseline.   Neuropathy: Discussed management. Will continue to monitor and focus on blood sugar control.   Obesity: BMI: 43.58. Lifestyle changes recommended: Diet consisting of low fat foods, lean meats, high fiber, fresh fruits and vegetables. 150 min/ weekly aerobic exercise.   Sleep Apnea: continue to use Pap therapy as indicated. Working with Pulmonology to obtain new equipment.       Cologuard: June 2023, negative.   Mammogram: June 2023. Ordered for 2024.    Shingrix: May/June 2023.   Tdap: August 2024.   Hepatitis B Vaccine: November 2024.     Lori Sarmiento, ADITI-CNS 11/20/24 10:31 AM

## 2024-11-20 NOTE — TELEPHONE ENCOUNTER
Patients local pharmacy has Ozempic on backorder and patient would like the prescription sent to [facility]

## 2024-11-20 NOTE — TELEPHONE ENCOUNTER
Requested Prescriptions     Pending Prescriptions Disp Refills    semaglutide 0.25 mg or 0.5 mg (2 mg/3 mL) pen injector 3 mL 2     Sig: Inject 0.25 mg under the skin 1 (one) time per week.     Please resend to mail order

## 2024-12-03 DIAGNOSIS — E03.9 HYPOTHYROIDISM, UNSPECIFIED TYPE: ICD-10-CM

## 2024-12-03 RX ORDER — LEVOTHYROXINE SODIUM 112 UG/1
TABLET ORAL
Qty: 90 TABLET | Refills: 3 | Status: SHIPPED | OUTPATIENT
Start: 2024-12-03

## 2024-12-03 NOTE — TELEPHONE ENCOUNTER
pended   Oculoplastic Surgeon Procedure Text (A): After obtaining clear surgical margins the patient was sent to oculoplastics for surgical repair.  The patient understands they will receive post-surgical care and follow-up from the referring physician's office.

## 2024-12-05 ENCOUNTER — APPOINTMENT (OUTPATIENT)
Dept: RADIOLOGY | Facility: CLINIC | Age: 59
End: 2024-12-05
Payer: COMMERCIAL

## 2024-12-16 ENCOUNTER — TELEPHONE (OUTPATIENT)
Dept: PULMONOLOGY | Facility: HOSPITAL | Age: 59
End: 2024-12-16
Payer: COMMERCIAL

## 2024-12-16 DIAGNOSIS — J45.51 SEVERE PERSISTENT ASTHMA WITH EXACERBATION (MULTI): Primary | ICD-10-CM

## 2024-12-16 RX ORDER — PREDNISONE 10 MG/1
TABLET ORAL
Qty: 40 TABLET | Refills: 0 | Status: SHIPPED | OUTPATIENT
Start: 2024-12-16 | End: 2025-01-01

## 2024-12-16 RX ORDER — AZITHROMYCIN 250 MG/1
TABLET, FILM COATED ORAL
Qty: 6 TABLET | Refills: 0 | Status: SHIPPED | OUTPATIENT
Start: 2024-12-16 | End: 2024-12-21

## 2024-12-16 NOTE — TELEPHONE ENCOUNTER
Patient is  agreeable to treatment plan. Patient instructed to call us if she is not feeling better. Patient acknowledged understanding. All questions answered at this time.     ----- Message from Karma Alba sent at 12/16/2024 10:42 AM EST -----   Sent her a prednisone taper and Azithromycin x 5 days.  ----- Message -----  From: Erin Akins RN  Sent: 12/16/2024  10:31 AM EST  To: ADITI Dimas-CNP    Patient called in stating that she is sick. Patient states she is coughing up green. Patient admits to her chest hurting. Patient requesting zpack and steroids.

## 2024-12-20 ENCOUNTER — APPOINTMENT (OUTPATIENT)
Dept: PRIMARY CARE | Facility: CLINIC | Age: 59
End: 2024-12-20
Payer: COMMERCIAL

## 2024-12-23 ENCOUNTER — HOSPITAL ENCOUNTER (OUTPATIENT)
Dept: RADIOLOGY | Facility: HOSPITAL | Age: 59
Discharge: HOME | End: 2024-12-23
Payer: COMMERCIAL

## 2024-12-23 ENCOUNTER — APPOINTMENT (OUTPATIENT)
Dept: PRIMARY CARE | Facility: CLINIC | Age: 59
End: 2024-12-23
Payer: COMMERCIAL

## 2024-12-23 DIAGNOSIS — Z12.31 VISIT FOR SCREENING MAMMOGRAM: ICD-10-CM

## 2024-12-23 DIAGNOSIS — R05.1 ACUTE COUGH: ICD-10-CM

## 2024-12-23 DIAGNOSIS — Z23 NEED FOR HEPATITIS B BOOSTER VACCINATION: ICD-10-CM

## 2024-12-23 DIAGNOSIS — E03.9 HYPOTHYROIDISM, UNSPECIFIED TYPE: ICD-10-CM

## 2024-12-23 DIAGNOSIS — E04.9 ENLARGED THYROID: ICD-10-CM

## 2024-12-23 DIAGNOSIS — E11.9 TYPE 2 DIABETES MELLITUS WITHOUT COMPLICATION, WITHOUT LONG-TERM CURRENT USE OF INSULIN (MULTI): ICD-10-CM

## 2024-12-23 DIAGNOSIS — I10 BENIGN HYPERTENSION: ICD-10-CM

## 2024-12-23 PROCEDURE — 71046 X-RAY EXAM CHEST 2 VIEWS: CPT

## 2024-12-23 PROCEDURE — 71046 X-RAY EXAM CHEST 2 VIEWS: CPT | Performed by: RADIOLOGY

## 2024-12-23 PROCEDURE — 90471 IMMUNIZATION ADMIN: CPT | Performed by: CLINICAL NURSE SPECIALIST

## 2024-12-23 PROCEDURE — 90746 HEPB VACCINE 3 DOSE ADULT IM: CPT | Performed by: CLINICAL NURSE SPECIALIST

## 2024-12-26 ENCOUNTER — TELEPHONE (OUTPATIENT)
Dept: PRIMARY CARE | Facility: CLINIC | Age: 59
End: 2024-12-26
Payer: COMMERCIAL

## 2025-02-13 LAB
25(OH)D3+25(OH)D2 SERPL-MCNC: 50 NG/ML (ref 30–100)
ALBUMIN SERPL-MCNC: 4 G/DL (ref 3.6–5.1)
ALP SERPL-CCNC: 48 U/L (ref 37–153)
ALT SERPL-CCNC: 21 U/L (ref 6–29)
ANION GAP SERPL CALCULATED.4IONS-SCNC: 7 MMOL/L (CALC) (ref 7–17)
AST SERPL-CCNC: 16 U/L (ref 10–35)
BILIRUB SERPL-MCNC: 0.7 MG/DL (ref 0.2–1.2)
BUN SERPL-MCNC: 14 MG/DL (ref 7–25)
CALCIUM SERPL-MCNC: 9.5 MG/DL (ref 8.6–10.4)
CHLORIDE SERPL-SCNC: 104 MMOL/L (ref 98–110)
CHOLEST SERPL-MCNC: 157 MG/DL
CHOLEST/HDLC SERPL: 3.3 (CALC)
CO2 SERPL-SCNC: 31 MMOL/L (ref 20–32)
CREAT SERPL-MCNC: 0.84 MG/DL (ref 0.5–1.03)
EGFRCR SERPLBLD CKD-EPI 2021: 80 ML/MIN/1.73M2
ERYTHROCYTE [DISTWIDTH] IN BLOOD BY AUTOMATED COUNT: 14.8 % (ref 11–15)
EST. AVERAGE GLUCOSE BLD GHB EST-MCNC: 137 MG/DL
EST. AVERAGE GLUCOSE BLD GHB EST-SCNC: 7.6 MMOL/L
GLUCOSE SERPL-MCNC: 90 MG/DL (ref 65–99)
HBA1C MFR BLD: 6.4 % OF TOTAL HGB
HCT VFR BLD AUTO: 42.3 % (ref 35–45)
HDLC SERPL-MCNC: 47 MG/DL
HGB BLD-MCNC: 13.3 G/DL (ref 11.7–15.5)
HIV 1+2 AB+HIV1 P24 AG SERPL QL IA: NORMAL
LDLC SERPL CALC-MCNC: 87 MG/DL (CALC)
MCH RBC QN AUTO: 27.5 PG (ref 27–33)
MCHC RBC AUTO-ENTMCNC: 31.4 G/DL (ref 32–36)
MCV RBC AUTO: 87.6 FL (ref 80–100)
NONHDLC SERPL-MCNC: 110 MG/DL (CALC)
PLATELET # BLD AUTO: 262 THOUSAND/UL (ref 140–400)
PMV BLD REES-ECKER: 10 FL (ref 7.5–12.5)
POTASSIUM SERPL-SCNC: 4.1 MMOL/L (ref 3.5–5.3)
PROT SERPL-MCNC: 6.7 G/DL (ref 6.1–8.1)
RBC # BLD AUTO: 4.83 MILLION/UL (ref 3.8–5.1)
SODIUM SERPL-SCNC: 142 MMOL/L (ref 135–146)
TRIGL SERPL-MCNC: 125 MG/DL
TSH SERPL-ACNC: 1.21 MIU/L (ref 0.4–4.5)
VIT B12 SERPL-MCNC: 527 PG/ML (ref 200–1100)
WBC # BLD AUTO: 6.7 THOUSAND/UL (ref 3.8–10.8)

## 2025-02-20 ENCOUNTER — APPOINTMENT (OUTPATIENT)
Dept: PRIMARY CARE | Facility: CLINIC | Age: 60
End: 2025-02-20
Payer: COMMERCIAL

## 2025-02-20 VITALS
DIASTOLIC BLOOD PRESSURE: 60 MMHG | BODY MASS INDEX: 42.11 KG/M2 | HEART RATE: 81 BPM | SYSTOLIC BLOOD PRESSURE: 110 MMHG | HEIGHT: 66 IN | WEIGHT: 262 LBS

## 2025-02-20 DIAGNOSIS — J44.9 CHRONIC OBSTRUCTIVE PULMONARY DISEASE, UNSPECIFIED COPD TYPE (MULTI): ICD-10-CM

## 2025-02-20 DIAGNOSIS — N18.4 CHRONIC KIDNEY DISEASE, STAGE 4 (SEVERE) (MULTI): Primary | ICD-10-CM

## 2025-02-20 DIAGNOSIS — E11.9 TYPE 2 DIABETES MELLITUS WITHOUT COMPLICATION, WITHOUT LONG-TERM CURRENT USE OF INSULIN (MULTI): ICD-10-CM

## 2025-02-20 DIAGNOSIS — Z23 NEED FOR HEPATITIS B BOOSTER VACCINATION: ICD-10-CM

## 2025-02-20 DIAGNOSIS — J96.21 ACUTE AND CHRONIC RESPIRATORY FAILURE WITH HYPOXIA (MULTI): ICD-10-CM

## 2025-02-20 DIAGNOSIS — E11.22 TYPE 2 DIABETES MELLITUS WITH CHRONIC KIDNEY DISEASE, WITHOUT LONG-TERM CURRENT USE OF INSULIN, UNSPECIFIED CKD STAGE (MULTI): ICD-10-CM

## 2025-02-20 DIAGNOSIS — I10 BENIGN HYPERTENSION: ICD-10-CM

## 2025-02-20 DIAGNOSIS — E04.9 ENLARGED THYROID: ICD-10-CM

## 2025-02-20 DIAGNOSIS — E03.9 HYPOTHYROIDISM, UNSPECIFIED TYPE: ICD-10-CM

## 2025-02-20 DIAGNOSIS — Z12.31 VISIT FOR SCREENING MAMMOGRAM: ICD-10-CM

## 2025-02-20 PROCEDURE — 90471 IMMUNIZATION ADMIN: CPT | Performed by: CLINICAL NURSE SPECIALIST

## 2025-02-20 PROCEDURE — 90746 HEPB VACCINE 3 DOSE ADULT IM: CPT | Performed by: CLINICAL NURSE SPECIALIST

## 2025-02-20 PROCEDURE — 99214 OFFICE O/P EST MOD 30 MIN: CPT | Performed by: CLINICAL NURSE SPECIALIST

## 2025-02-20 PROCEDURE — 3008F BODY MASS INDEX DOCD: CPT | Performed by: CLINICAL NURSE SPECIALIST

## 2025-02-20 PROCEDURE — 1036F TOBACCO NON-USER: CPT | Performed by: CLINICAL NURSE SPECIALIST

## 2025-02-20 PROCEDURE — 4010F ACE/ARB THERAPY RXD/TAKEN: CPT | Performed by: CLINICAL NURSE SPECIALIST

## 2025-02-20 PROCEDURE — 3078F DIAST BP <80 MM HG: CPT | Performed by: CLINICAL NURSE SPECIALIST

## 2025-02-20 PROCEDURE — 3074F SYST BP LT 130 MM HG: CPT | Performed by: CLINICAL NURSE SPECIALIST

## 2025-02-20 ASSESSMENT — ENCOUNTER SYMPTOMS
BRUISES/BLEEDS EASILY: 0
TROUBLE SWALLOWING: 0
CONFUSION: 0
LOSS OF SENSATION IN FEET: 0
BLOOD IN STOOL: 0
DYSURIA: 0
MYALGIAS: 0
NAUSEA: 0
HEMATURIA: 0
CONSTIPATION: 0
APPETITE CHANGE: 0
SORE THROAT: 0
OCCASIONAL FEELINGS OF UNSTEADINESS: 0
SEIZURES: 0
DIZZINESS: 0
UNEXPECTED WEIGHT CHANGE: 0
EYE PAIN: 0
NECK PAIN: 0
SLEEP DISTURBANCE: 0
DIARRHEA: 0
JOINT SWELLING: 0
CHEST TIGHTNESS: 0
FATIGUE: 0
FEVER: 0
WHEEZING: 0
POLYDIPSIA: 0
PHOTOPHOBIA: 0
VOMITING: 0
HEADACHES: 0
PALPITATIONS: 0
BACK PAIN: 0
WOUND: 0
DEPRESSION: 0
ABDOMINAL PAIN: 0
ARTHRALGIAS: 0
COUGH: 0
CHILLS: 0
ACTIVITY CHANGE: 0
SHORTNESS OF BREATH: 0
FLANK PAIN: 0

## 2025-02-20 ASSESSMENT — PATIENT HEALTH QUESTIONNAIRE - PHQ9
1. LITTLE INTEREST OR PLEASURE IN DOING THINGS: NOT AT ALL
2. FEELING DOWN, DEPRESSED OR HOPELESS: NOT AT ALL
SUM OF ALL RESPONSES TO PHQ9 QUESTIONS 1 AND 2: 0

## 2025-02-20 NOTE — PROGRESS NOTES
Subjective   Patient ID: Zohra Toledo is a 59 y.o. female who presents for Follow-up (Follow up labs).  HPI    Here today as a follow up appointment. Discuss lab results.      Increased pain in left foot, great toe. Painful to touch. Even the sheet uncomfortable. Pins and needles sensation. Has not tried anything OTC. Improved with starting Ozempic and improvement in Sugars.      Diabetes. Up to date with [facility] exams. Has been monitoring her blood sugars at home, well controlled. No longer taking her Metformin.      Review of Systems   Constitutional:  Negative for activity change, appetite change, chills, fatigue, fever and unexpected weight change.   HENT:  Negative for ear pain, hearing loss, nosebleeds, sore throat, tinnitus and trouble swallowing.    Eyes:  Negative for photophobia, pain and visual disturbance.   Respiratory:  Negative for cough, chest tightness, shortness of breath and wheezing.    Cardiovascular:  Negative for chest pain, palpitations and leg swelling.   Gastrointestinal:  Negative for abdominal pain, blood in stool, constipation, diarrhea, nausea and vomiting.   Endocrine: Negative for cold intolerance, heat intolerance, polydipsia and polyuria.   Genitourinary:  Negative for dysuria, flank pain and hematuria.   Musculoskeletal:  Negative for arthralgias, back pain, joint swelling, myalgias and neck pain.   Skin:  Negative for pallor, rash and wound.   Allergic/Immunologic: Negative for immunocompromised state.   Neurological:  Negative for dizziness, seizures and headaches.   Hematological:  Does not bruise/bleed easily.   Psychiatric/Behavioral:  Negative for confusion and sleep disturbance.        Objective   Physical Exam  Vitals and nursing note reviewed.   Constitutional:       General: She is not in acute distress.     Appearance: Normal appearance.   HENT:      Head: Normocephalic.      Nose: Nose normal.   Eyes:      Conjunctiva/sclera: Conjunctivae normal.   Neck:      Vascular:  No carotid bruit.   Cardiovascular:      Rate and Rhythm: Normal rate and regular rhythm.      Pulses: Normal pulses.      Heart sounds: Normal heart sounds.   Pulmonary:      Effort: Pulmonary effort is normal.      Breath sounds: Normal breath sounds.   Abdominal:      General: Bowel sounds are normal.      Palpations: Abdomen is soft.   Musculoskeletal:         General: Normal range of motion.      Cervical back: Normal range of motion.   Skin:     General: Skin is warm and dry.   Neurological:      Mental Status: She is alert and oriented to person, place, and time. Mental status is at baseline.   Psychiatric:         Mood and Affect: Mood normal.         Behavior: Behavior normal.       Assessment/Plan        Reviewed results of blood work completed with patient.      Hypertension: Blood pressure better controlled at OV today. Encourage ambulatory blood pressure monitoring. Notify office with elevated readings.   Hypothyroidism, Enlarged Thyroid: Continue Levothyroxine. US completed.   Diabetes: A1C 6.4% on last blood work. Albumin: August 2024. Discussed monitoring of blood sugar and goals of care. CT Cardiac Scoring: Scheduled for April 2025.  Did not tolerate Metformin. Increase Ozempic.   Chronic Respiratory Failure, COPD: Follows with Pulmonology for management. Respiratory status at baseline.   Neuropathy: Discussed management. Will continue to monitor and focus on blood sugar control.   Obesity: BMI: 42.29. Lifestyle changes recommended: Diet consisting of low fat foods, lean meats, high fiber, fresh fruits and vegetables. 150 min/ weekly aerobic exercise.   Sleep Apnea: continue to use Pap therapy as indicated. Working with Pulmonology.       Cologuard: June 2023, negative.   Mammogram: August 2024, normal. Ordered for 2025.  Shingrix: May/June 2023.   Tdap: August 2024.   Hepatitis B Vaccine Series: Completed.     Lori Sarmiento, ADITI-CNS 02/20/25 10:11 AM

## 2025-03-16 DIAGNOSIS — J45.51 SEVERE PERSISTENT ASTHMA WITH EXACERBATION (MULTI): ICD-10-CM

## 2025-03-17 RX ORDER — MONTELUKAST SODIUM 10 MG/1
10 TABLET ORAL NIGHTLY
Qty: 90 TABLET | Refills: 3 | Status: SHIPPED | OUTPATIENT
Start: 2025-03-17

## 2025-03-21 DIAGNOSIS — E11.9 TYPE 2 DIABETES MELLITUS WITHOUT COMPLICATION, WITHOUT LONG-TERM CURRENT USE OF INSULIN (MULTI): ICD-10-CM

## 2025-03-21 NOTE — TELEPHONE ENCOUNTER
Patient called to request medication refill.    Last appointment with our providers: 02/202025  Next appointment with our providers: 08/20/2025  Name of Medication:    semaglutide 0.25 mg or 0.5 mg (2 mg/3 mL) pen injector       Pharmacy:  [facility] - [address] [id number]

## 2025-03-24 ENCOUNTER — TELEPHONE (OUTPATIENT)
Dept: PRIMARY CARE | Facility: CLINIC | Age: 60
End: 2025-03-24
Payer: COMMERCIAL

## 2025-03-24 DIAGNOSIS — E11.9 TYPE 2 DIABETES MELLITUS WITHOUT COMPLICATION, WITHOUT LONG-TERM CURRENT USE OF INSULIN: ICD-10-CM

## 2025-03-24 NOTE — TELEPHONE ENCOUNTER
Voicemail    Pharmacy will not fill states it is too early she thinks that they are aware of the change in dose. Need added to script resent called abck needs to be 90 day supply

## 2025-04-10 ENCOUNTER — HOSPITAL ENCOUNTER (OUTPATIENT)
Dept: RADIOLOGY | Facility: CLINIC | Age: 60
Discharge: HOME | End: 2025-04-10
Payer: COMMERCIAL

## 2025-04-10 DIAGNOSIS — E11.9 TYPE 2 DIABETES MELLITUS WITHOUT COMPLICATION, WITHOUT LONG-TERM CURRENT USE OF INSULIN: ICD-10-CM

## 2025-04-10 PROCEDURE — 75571 CT HRT W/O DYE W/CA TEST: CPT

## 2025-04-17 ENCOUNTER — TELEPHONE (OUTPATIENT)
Dept: PULMONOLOGY | Facility: HOSPITAL | Age: 60
End: 2025-04-17
Payer: COMMERCIAL

## 2025-04-17 DIAGNOSIS — R91.1 LUNG NODULE: Primary | ICD-10-CM

## 2025-04-17 NOTE — TELEPHONE ENCOUNTER
Patient called in wanting to switch to the infusion center for her nucala. Instructed patient she has 6000 left on her copay card. She would need to call her insurance to find out about coverage in the infusion center. Patient now wanting to defer this. Patient going to call back in July so we can reassess copay card amount and any insurance issues with nucala. Patient acknowledged understanding. All questions answered at this time.

## 2025-04-17 NOTE — TELEPHONE ENCOUNTER
Patient acknowledged understanding. All questions answered at this time.     ----- Message from Karma Alba sent at 4/17/2025  9:41 AM EDT -----  Yes, thank you for letting me know we will repeat a CT chest in 6 months for her in October just have her get this prior to her appointment. It looks like it may just be a small area of atelectasis.  ----- Message -----  From: Erin Akins RN  Sent: 4/17/2025   9:00 AM EDT  To: ADITI Dimas-CNP    Patient had a Cardiac scoring CT. They found a 6mm nodule. She wants you to look at it to see if we need to do anything or if it is concerning.

## 2025-04-25 ENCOUNTER — TELEPHONE (OUTPATIENT)
Dept: PRIMARY CARE | Facility: CLINIC | Age: 60
End: 2025-04-25
Payer: COMMERCIAL

## 2025-04-25 NOTE — TELEPHONE ENCOUNTER
I am not sure that I can change the code. We are not screening for Diagnosis when she has already been diagnosed with Diabetes, Hypertension, CKD. We are follow up on her health conditions is the reason for the blood work.

## 2025-04-25 NOTE — TELEPHONE ENCOUNTER
Patient is asking if lab orders done in February can be re-coded, insurance is not paying for them bcause they need to be coded as annual labs

## 2025-05-14 ENCOUNTER — TELEPHONE (OUTPATIENT)
Dept: PULMONOLOGY | Facility: HOSPITAL | Age: 60
End: 2025-05-14
Payer: COMMERCIAL

## 2025-05-14 DIAGNOSIS — R06.02 SHORTNESS OF BREATH: ICD-10-CM

## 2025-05-14 DIAGNOSIS — J45.51 SEVERE PERSISTENT ASTHMA WITH EXACERBATION (MULTI): Primary | ICD-10-CM

## 2025-05-14 RX ORDER — DOXYCYCLINE 100 MG/1
100 CAPSULE ORAL 2 TIMES DAILY
Qty: 14 CAPSULE | Refills: 0 | Status: SHIPPED | OUTPATIENT
Start: 2025-05-14 | End: 2025-05-21

## 2025-05-14 RX ORDER — PREDNISONE 10 MG/1
TABLET ORAL
Qty: 40 TABLET | Refills: 0 | Status: SHIPPED | OUTPATIENT
Start: 2025-05-14 | End: 2025-05-30

## 2025-05-14 NOTE — TELEPHONE ENCOUNTER
Called and spoke with patient and let her know to get the CXR done when she was able to and to take antibiotic and prednisone taper as prescribed. Instructed her to call us back with any further questions or concerns. Patient was agreeable to treatment plan and acknowledged understanding.     ----- Message from Karma Alba sent at 5/14/2025  9:47 AM EDT -----  Regarding: RE: Sick  I sent a prednisone taper and Doxycyline BID x 1 week also I ordered a STAT CXR for her.  ----- Message -----  From: Maribel Larkin MA  Sent: 5/14/2025   9:06 AM EDT  To: ADITI Dimas-CNP  Subject: Sick                                             Patient called in stating she has been really sick and coughing up for the last couple of days to the point where she has lost her voice. She is requesting antibiotics and steroids be sent in to [facility].

## 2025-05-17 ENCOUNTER — HOSPITAL ENCOUNTER (OUTPATIENT)
Dept: RADIOLOGY | Facility: HOSPITAL | Age: 60
Discharge: HOME | End: 2025-05-17
Payer: COMMERCIAL

## 2025-05-17 DIAGNOSIS — R06.02 SHORTNESS OF BREATH: ICD-10-CM

## 2025-05-17 PROCEDURE — 71046 X-RAY EXAM CHEST 2 VIEWS: CPT

## 2025-05-17 PROCEDURE — 71046 X-RAY EXAM CHEST 2 VIEWS: CPT | Performed by: RADIOLOGY

## 2025-05-19 ENCOUNTER — TELEPHONE (OUTPATIENT)
Dept: PULMONOLOGY | Facility: HOSPITAL | Age: 60
End: 2025-05-19
Payer: COMMERCIAL

## 2025-05-19 NOTE — TELEPHONE ENCOUNTER
Patient acknowledged understanding. All questions answered at this time.     ----- Message from Karma Alba sent at 5/19/2025  9:07 AM EDT -----  Please call the patient and let her know that her CXR is normal.   ----- Message -----  From: Interface, Radiology Results In  Sent: 5/18/2025   8:01 AM EDT  To: ADITI Dimas-CNP

## 2025-07-29 ENCOUNTER — TELEPHONE (OUTPATIENT)
Dept: PULMONOLOGY | Facility: HOSPITAL | Age: 60
End: 2025-07-29
Payer: COMMERCIAL

## 2025-07-29 NOTE — TELEPHONE ENCOUNTER
Patient is going to stay at [facility] due to insurance not covering it elsewhere. Patient advised not to come off of this. Patient to call us if she needs assistance.  ----- Message from Karma Alba sent at 7/29/2025 10:42 AM EDT -----  I do not recommend stopping Nucala. We will need to check with [facility]? I do not want to send her script over there and mess something up so if you or the patient can call and check?  ----- Message -----  From: Erin Akins RN  Sent: 7/29/2025   9:59 AM EDT  To: Karma Alba, APRN-CNP    Patient is having a hard time with her specialty pharmacy every month. She is requesting to either come off nucala. Or is there anyway we can try [facility]?

## 2025-08-12 ENCOUNTER — TELEPHONE (OUTPATIENT)
Dept: PULMONOLOGY | Facility: HOSPITAL | Age: 60
End: 2025-08-12
Payer: COMMERCIAL

## 2025-08-12 DIAGNOSIS — J45.50 SEVERE PERSISTENT ASTHMA WITHOUT COMPLICATION (MULTI): ICD-10-CM

## 2025-08-12 RX ORDER — MEPOLIZUMAB 100 MG/ML
100 INJECTION, SOLUTION SUBCUTANEOUS
Qty: 3 EACH | Refills: 3 | Status: SHIPPED | OUTPATIENT
Start: 2025-08-12

## 2025-08-13 ENCOUNTER — TELEPHONE (OUTPATIENT)
Dept: PULMONOLOGY | Facility: HOSPITAL | Age: 60
End: 2025-08-13
Payer: COMMERCIAL

## 2025-08-20 ENCOUNTER — APPOINTMENT (OUTPATIENT)
Dept: PRIMARY CARE | Facility: CLINIC | Age: 60
End: 2025-08-20
Payer: COMMERCIAL

## 2025-08-20 ENCOUNTER — HOSPITAL ENCOUNTER (OUTPATIENT)
Dept: RADIOLOGY | Facility: HOSPITAL | Age: 60
Discharge: HOME | End: 2025-08-20
Payer: COMMERCIAL

## 2025-08-20 VITALS
SYSTOLIC BLOOD PRESSURE: 110 MMHG | HEART RATE: 75 BPM | BODY MASS INDEX: 42.59 KG/M2 | WEIGHT: 265 LBS | HEIGHT: 66 IN | DIASTOLIC BLOOD PRESSURE: 70 MMHG

## 2025-08-20 DIAGNOSIS — E11.9 TYPE 2 DIABETES MELLITUS WITHOUT COMPLICATION, WITHOUT LONG-TERM CURRENT USE OF INSULIN: ICD-10-CM

## 2025-08-20 DIAGNOSIS — E04.9 ENLARGED THYROID: ICD-10-CM

## 2025-08-20 DIAGNOSIS — M25.512 LEFT SHOULDER PAIN, UNSPECIFIED CHRONICITY: ICD-10-CM

## 2025-08-20 DIAGNOSIS — I10 BENIGN HYPERTENSION: ICD-10-CM

## 2025-08-20 DIAGNOSIS — Z12.31 VISIT FOR SCREENING MAMMOGRAM: ICD-10-CM

## 2025-08-20 DIAGNOSIS — M25.512 LEFT SHOULDER PAIN, UNSPECIFIED CHRONICITY: Primary | ICD-10-CM

## 2025-08-20 DIAGNOSIS — Z23 NEED FOR HEPATITIS B BOOSTER VACCINATION: ICD-10-CM

## 2025-08-20 DIAGNOSIS — E03.9 HYPOTHYROIDISM, UNSPECIFIED TYPE: ICD-10-CM

## 2025-08-20 PROCEDURE — 4010F ACE/ARB THERAPY RXD/TAKEN: CPT | Performed by: CLINICAL NURSE SPECIALIST

## 2025-08-20 PROCEDURE — 99214 OFFICE O/P EST MOD 30 MIN: CPT | Performed by: CLINICAL NURSE SPECIALIST

## 2025-08-20 PROCEDURE — 73030 X-RAY EXAM OF SHOULDER: CPT | Mod: LEFT SIDE | Performed by: RADIOLOGY

## 2025-08-20 PROCEDURE — 3074F SYST BP LT 130 MM HG: CPT | Performed by: CLINICAL NURSE SPECIALIST

## 2025-08-20 PROCEDURE — 73030 X-RAY EXAM OF SHOULDER: CPT | Mod: LT

## 2025-08-20 PROCEDURE — 3078F DIAST BP <80 MM HG: CPT | Performed by: CLINICAL NURSE SPECIALIST

## 2025-08-20 PROCEDURE — 3008F BODY MASS INDEX DOCD: CPT | Performed by: CLINICAL NURSE SPECIALIST

## 2025-08-20 RX ORDER — LEVOTHYROXINE SODIUM 112 UG/1
TABLET ORAL
Qty: 90 TABLET | Refills: 3 | Status: SHIPPED | OUTPATIENT
Start: 2025-08-20

## 2025-08-20 RX ORDER — LOSARTAN POTASSIUM 100 MG/1
100 TABLET ORAL DAILY
Qty: 90 TABLET | Refills: 3 | Status: SHIPPED | OUTPATIENT
Start: 2025-08-20

## 2025-08-20 ASSESSMENT — ENCOUNTER SYMPTOMS
HEMATURIA: 0
SLEEP DISTURBANCE: 0
DYSURIA: 0
PALPITATIONS: 0
FEVER: 0
ARTHRALGIAS: 1
CONFUSION: 0
LOSS OF SENSATION IN FEET: 0
APPETITE CHANGE: 0
DIZZINESS: 0
DEPRESSION: 0
PHOTOPHOBIA: 0
WHEEZING: 0
UNEXPECTED WEIGHT CHANGE: 0
COUGH: 0
MYALGIAS: 0
NECK PAIN: 0
VOMITING: 0
BRUISES/BLEEDS EASILY: 0
OCCASIONAL FEELINGS OF UNSTEADINESS: 0
SHORTNESS OF BREATH: 0
ACTIVITY CHANGE: 0
DIARRHEA: 0
HEADACHES: 0
BLOOD IN STOOL: 0
POLYDIPSIA: 0
NAUSEA: 0
SORE THROAT: 0
CHILLS: 0
FATIGUE: 0
ABDOMINAL PAIN: 0
WOUND: 0
FLANK PAIN: 0
EYE PAIN: 0
TROUBLE SWALLOWING: 0
CONSTIPATION: 0
SEIZURES: 0
BACK PAIN: 0
JOINT SWELLING: 1
CHEST TIGHTNESS: 0

## 2025-08-20 ASSESSMENT — COLUMBIA-SUICIDE SEVERITY RATING SCALE - C-SSRS
1. IN THE PAST MONTH, HAVE YOU WISHED YOU WERE DEAD OR WISHED YOU COULD GO TO SLEEP AND NOT WAKE UP?: NO
6. HAVE YOU EVER DONE ANYTHING, STARTED TO DO ANYTHING, OR PREPARED TO DO ANYTHING TO END YOUR LIFE?: NO
2. HAVE YOU ACTUALLY HAD ANY THOUGHTS OF KILLING YOURSELF?: NO

## 2025-08-21 LAB
25(OH)D3+25(OH)D2 SERPL-MCNC: 41 NG/ML (ref 30–100)
ALBUMIN SERPL-MCNC: 3.9 G/DL (ref 3.6–5.1)
ALP SERPL-CCNC: 46 U/L (ref 37–153)
ALT SERPL-CCNC: 27 U/L (ref 6–29)
ANION GAP SERPL CALCULATED.4IONS-SCNC: 7 MMOL/L (CALC) (ref 7–17)
AST SERPL-CCNC: 18 U/L (ref 10–35)
BILIRUB SERPL-MCNC: 0.5 MG/DL (ref 0.2–1.2)
BUN SERPL-MCNC: 18 MG/DL (ref 7–25)
CALCIUM SERPL-MCNC: 9.3 MG/DL (ref 8.6–10.4)
CHLORIDE SERPL-SCNC: 104 MMOL/L (ref 98–110)
CHOLEST SERPL-MCNC: 151 MG/DL
CHOLEST/HDLC SERPL: 3.7 (CALC)
CO2 SERPL-SCNC: 30 MMOL/L (ref 20–32)
CREAT SERPL-MCNC: 1.01 MG/DL (ref 0.5–1.05)
EGFRCR SERPLBLD CKD-EPI 2021: 64 ML/MIN/1.73M2
ERYTHROCYTE [DISTWIDTH] IN BLOOD BY AUTOMATED COUNT: 14.4 % (ref 11–15)
EST. AVERAGE GLUCOSE BLD GHB EST-MCNC: 131 MG/DL
EST. AVERAGE GLUCOSE BLD GHB EST-SCNC: 7.3 MMOL/L
GLUCOSE SERPL-MCNC: 102 MG/DL (ref 65–99)
HBA1C MFR BLD: 6.2 %
HCT VFR BLD AUTO: 40.2 % (ref 35–45)
HDLC SERPL-MCNC: 41 MG/DL
HGB BLD-MCNC: 12.7 G/DL (ref 11.7–15.5)
LDLC SERPL CALC-MCNC: 83 MG/DL (CALC)
MCH RBC QN AUTO: 28 PG (ref 27–33)
MCHC RBC AUTO-ENTMCNC: 31.6 G/DL (ref 32–36)
MCV RBC AUTO: 88.7 FL (ref 80–100)
NONHDLC SERPL-MCNC: 110 MG/DL (CALC)
PLATELET # BLD AUTO: 242 THOUSAND/UL (ref 140–400)
PMV BLD REES-ECKER: 9.6 FL (ref 7.5–12.5)
POTASSIUM SERPL-SCNC: 4.6 MMOL/L (ref 3.5–5.3)
PROT SERPL-MCNC: 6.5 G/DL (ref 6.1–8.1)
RBC # BLD AUTO: 4.53 MILLION/UL (ref 3.8–5.1)
SODIUM SERPL-SCNC: 141 MMOL/L (ref 135–146)
TRIGL SERPL-MCNC: 170 MG/DL
TSH SERPL-ACNC: 0.78 MIU/L (ref 0.4–4.5)
VIT B12 SERPL-MCNC: 543 PG/ML (ref 200–1100)
WBC # BLD AUTO: 5.8 THOUSAND/UL (ref 3.8–10.8)

## 2025-08-22 ENCOUNTER — RESULTS FOLLOW-UP (OUTPATIENT)
Dept: PRIMARY CARE | Facility: CLINIC | Age: 60
End: 2025-08-22
Payer: COMMERCIAL

## 2025-08-28 DIAGNOSIS — J45.50 SEVERE PERSISTENT ASTHMA WITHOUT COMPLICATION (MULTI): ICD-10-CM

## 2025-09-03 ENCOUNTER — HOSPITAL ENCOUNTER (OUTPATIENT)
Dept: RADIOLOGY | Facility: HOSPITAL | Age: 60
Discharge: HOME | End: 2025-09-03
Payer: COMMERCIAL

## 2025-09-03 VITALS — WEIGHT: 265 LBS | BODY MASS INDEX: 42.59 KG/M2 | HEIGHT: 66 IN

## 2025-09-03 DIAGNOSIS — Z12.31 VISIT FOR SCREENING MAMMOGRAM: ICD-10-CM

## 2025-09-03 PROCEDURE — 77067 SCR MAMMO BI INCL CAD: CPT | Performed by: RADIOLOGY

## 2025-09-03 PROCEDURE — 77063 BREAST TOMOSYNTHESIS BI: CPT | Performed by: RADIOLOGY

## 2025-09-03 PROCEDURE — 77067 SCR MAMMO BI INCL CAD: CPT

## 2025-09-05 ENCOUNTER — TELEPHONE (OUTPATIENT)
Dept: PULMONOLOGY | Facility: HOSPITAL | Age: 60
End: 2025-09-05
Payer: COMMERCIAL

## 2025-10-28 ENCOUNTER — APPOINTMENT (OUTPATIENT)
Dept: PULMONOLOGY | Facility: HOSPITAL | Age: 60
End: 2025-10-28
Payer: COMMERCIAL

## 2026-03-16 ENCOUNTER — APPOINTMENT (OUTPATIENT)
Dept: PRIMARY CARE | Facility: CLINIC | Age: 61
End: 2026-03-16
Payer: COMMERCIAL